# Patient Record
Sex: FEMALE | Race: WHITE | NOT HISPANIC OR LATINO | Employment: FULL TIME | URBAN - METROPOLITAN AREA
[De-identification: names, ages, dates, MRNs, and addresses within clinical notes are randomized per-mention and may not be internally consistent; named-entity substitution may affect disease eponyms.]

---

## 2023-02-24 PROBLEM — G47.00 INSOMNIA: Status: ACTIVE | Noted: 2023-02-24

## 2023-02-24 PROBLEM — E11.22 CKD STAGE 3 SECONDARY TO DIABETES (MULTI): Status: ACTIVE | Noted: 2023-02-24

## 2023-02-24 PROBLEM — F41.9 ANXIETY: Status: ACTIVE | Noted: 2023-02-24

## 2023-02-24 PROBLEM — J45.998 SEASONAL ASTHMA (HHS-HCC): Status: ACTIVE | Noted: 2023-02-24

## 2023-02-24 PROBLEM — F32.A DEPRESSION: Status: ACTIVE | Noted: 2023-02-24

## 2023-02-24 PROBLEM — E78.5 HYPERLIPIDEMIA: Status: ACTIVE | Noted: 2023-02-24

## 2023-02-24 PROBLEM — J30.89 ENVIRONMENTAL AND SEASONAL ALLERGIES: Status: ACTIVE | Noted: 2023-02-24

## 2023-02-24 PROBLEM — E55.9 VITAMIN D DEFICIENCY: Status: ACTIVE | Noted: 2023-02-24

## 2023-02-24 PROBLEM — E03.9 HYPOTHYROIDISM: Status: ACTIVE | Noted: 2023-02-24

## 2023-02-24 PROBLEM — U07.1 COVID-19 VIRUS INFECTION: Status: ACTIVE | Noted: 2023-02-24

## 2023-02-24 PROBLEM — N18.30 CKD STAGE 3 SECONDARY TO DIABETES (MULTI): Status: ACTIVE | Noted: 2023-02-24

## 2023-02-24 PROBLEM — M19.90 ARTHRITIS: Status: ACTIVE | Noted: 2023-02-24

## 2023-02-24 PROBLEM — L30.9 ECZEMA: Status: ACTIVE | Noted: 2023-02-24

## 2023-02-24 PROBLEM — M17.9 OSTEOARTHRITIS OF KNEE: Status: ACTIVE | Noted: 2023-02-24

## 2023-02-24 PROBLEM — E66.01 MORBID OBESITY WITH BMI OF 60.0-69.9, ADULT (MULTI): Status: ACTIVE | Noted: 2023-02-24

## 2023-02-24 PROBLEM — E11.649: Status: ACTIVE | Noted: 2023-02-24

## 2023-02-24 PROBLEM — R53.83 FATIGUE: Status: ACTIVE | Noted: 2023-02-24

## 2023-02-24 PROBLEM — I10 HYPERTENSION: Status: ACTIVE | Noted: 2023-02-24

## 2023-02-24 RX ORDER — LEVOTHYROXINE SODIUM 112 UG/1
1 TABLET ORAL DAILY
COMMUNITY
Start: 2021-01-04 | End: 2023-03-29 | Stop reason: SDUPTHER

## 2023-02-24 RX ORDER — ERGOCALCIFEROL 1.25 MG/1
1 CAPSULE ORAL
COMMUNITY
Start: 2021-01-08 | End: 2023-03-29 | Stop reason: SDUPTHER

## 2023-02-24 RX ORDER — CYCLOBENZAPRINE HCL 10 MG
1 TABLET ORAL DAILY PRN
COMMUNITY
End: 2023-03-29 | Stop reason: SDUPTHER

## 2023-02-24 RX ORDER — SIMVASTATIN 40 MG/1
1 TABLET, FILM COATED ORAL EVERY EVENING
COMMUNITY
End: 2023-03-29 | Stop reason: SDUPTHER

## 2023-02-24 RX ORDER — LISINOPRIL 40 MG/1
1 TABLET ORAL DAILY
COMMUNITY
Start: 2016-02-09 | End: 2023-03-29 | Stop reason: SDUPTHER

## 2023-02-24 RX ORDER — CLOBETASOL PROPIONATE 0.5 MG/G
AEROSOL, FOAM TOPICAL 2 TIMES DAILY
COMMUNITY
Start: 2014-09-06 | End: 2023-03-29 | Stop reason: SDUPTHER

## 2023-02-24 RX ORDER — PIOGLITAZONEHYDROCHLORIDE 30 MG/1
1 TABLET ORAL DAILY
COMMUNITY
End: 2023-03-29 | Stop reason: SDUPTHER

## 2023-02-24 RX ORDER — ALBUTEROL SULFATE 0.83 MG/ML
2.5 SOLUTION RESPIRATORY (INHALATION) EVERY 4 HOURS PRN
COMMUNITY
Start: 2014-09-22 | End: 2024-03-20 | Stop reason: ALTCHOICE

## 2023-02-24 RX ORDER — ALBUTEROL SULFATE 90 UG/1
2 AEROSOL, METERED RESPIRATORY (INHALATION)
COMMUNITY
Start: 2014-09-22 | End: 2024-06-10 | Stop reason: SDUPTHER

## 2023-02-24 RX ORDER — LIFITEGRAST 50 MG/ML
SOLUTION/ DROPS OPHTHALMIC
COMMUNITY
Start: 2021-11-10 | End: 2024-03-20 | Stop reason: ALTCHOICE

## 2023-02-24 RX ORDER — MONTELUKAST SODIUM 10 MG/1
1 TABLET ORAL DAILY
COMMUNITY
Start: 2019-01-02 | End: 2023-03-29 | Stop reason: SDUPTHER

## 2023-02-24 RX ORDER — CELECOXIB 200 MG/1
200 CAPSULE ORAL 2 TIMES DAILY
COMMUNITY
Start: 2019-11-27 | End: 2023-03-29 | Stop reason: SDUPTHER

## 2023-03-08 ENCOUNTER — APPOINTMENT (OUTPATIENT)
Dept: PRIMARY CARE | Facility: CLINIC | Age: 58
End: 2023-03-08

## 2023-03-10 ENCOUNTER — TELEPHONE (OUTPATIENT)
Dept: PRIMARY CARE | Facility: CLINIC | Age: 58
End: 2023-03-10

## 2023-03-10 DIAGNOSIS — E11.9 TYPE 2 DIABETES MELLITUS WITHOUT COMPLICATION, WITHOUT LONG-TERM CURRENT USE OF INSULIN (MULTI): Primary | ICD-10-CM

## 2023-03-10 NOTE — TELEPHONE ENCOUNTER
Refill(s) requested for: Jardiance (25 mg)    Pharmacy:   Pharmacy Number: [facility]    LR: 02/22/2023  LV: 08/24/2022  NV: None

## 2023-03-18 DIAGNOSIS — E03.9 HYPOTHYROIDISM, UNSPECIFIED TYPE: ICD-10-CM

## 2023-03-18 DIAGNOSIS — I10 HYPERTENSION, UNSPECIFIED TYPE: ICD-10-CM

## 2023-03-18 DIAGNOSIS — E78.5 HYPERLIPIDEMIA, UNSPECIFIED HYPERLIPIDEMIA TYPE: ICD-10-CM

## 2023-03-18 DIAGNOSIS — R53.83 OTHER FATIGUE: ICD-10-CM

## 2023-03-23 ENCOUNTER — OFFICE VISIT (OUTPATIENT)
Dept: PRIMARY CARE | Facility: CLINIC | Age: 58
End: 2023-03-23

## 2023-03-23 DIAGNOSIS — Z12.31 VISIT FOR SCREENING MAMMOGRAM: ICD-10-CM

## 2023-03-23 DIAGNOSIS — Z12.31 ENCOUNTER FOR SCREENING MAMMOGRAM FOR MALIGNANT NEOPLASM OF BREAST: ICD-10-CM

## 2023-03-29 ENCOUNTER — OFFICE VISIT (OUTPATIENT)
Dept: PRIMARY CARE | Facility: CLINIC | Age: 58
End: 2023-03-29
Payer: COMMERCIAL

## 2023-03-29 VITALS
DIASTOLIC BLOOD PRESSURE: 79 MMHG | SYSTOLIC BLOOD PRESSURE: 130 MMHG | HEIGHT: 66 IN | BODY MASS INDEX: 47.09 KG/M2 | WEIGHT: 293 LBS | OXYGEN SATURATION: 97 % | HEART RATE: 74 BPM

## 2023-03-29 DIAGNOSIS — E66.01 MORBID OBESITY WITH BMI OF 60.0-69.9, ADULT (MULTI): ICD-10-CM

## 2023-03-29 DIAGNOSIS — J45.998 SEASONAL ASTHMA (HHS-HCC): ICD-10-CM

## 2023-03-29 DIAGNOSIS — R19.5 CHANGE IN CONSISTENCY OF STOOL: ICD-10-CM

## 2023-03-29 DIAGNOSIS — I10 PRIMARY HYPERTENSION: ICD-10-CM

## 2023-03-29 DIAGNOSIS — E11.649 CONTROLLED TYPE 2 DIABETES MELLITUS WITH HYPOGLYCEMIA, WITHOUT LONG-TERM CURRENT USE OF INSULIN (MULTI): ICD-10-CM

## 2023-03-29 DIAGNOSIS — E55.9 VITAMIN D DEFICIENCY: ICD-10-CM

## 2023-03-29 DIAGNOSIS — L30.9 ECZEMA, UNSPECIFIED TYPE: ICD-10-CM

## 2023-03-29 DIAGNOSIS — E11.22 CKD STAGE 3 SECONDARY TO DIABETES (MULTI): ICD-10-CM

## 2023-03-29 DIAGNOSIS — F41.9 ANXIETY: ICD-10-CM

## 2023-03-29 DIAGNOSIS — M19.90 ARTHRITIS: ICD-10-CM

## 2023-03-29 DIAGNOSIS — J30.89 ENVIRONMENTAL AND SEASONAL ALLERGIES: ICD-10-CM

## 2023-03-29 DIAGNOSIS — E78.49 OTHER HYPERLIPIDEMIA: ICD-10-CM

## 2023-03-29 DIAGNOSIS — F32.0 CURRENT MILD EPISODE OF MAJOR DEPRESSIVE DISORDER WITHOUT PRIOR EPISODE (CMS-HCC): ICD-10-CM

## 2023-03-29 DIAGNOSIS — N18.30 CKD STAGE 3 SECONDARY TO DIABETES (MULTI): ICD-10-CM

## 2023-03-29 DIAGNOSIS — E11.9 TYPE 2 DIABETES MELLITUS WITHOUT COMPLICATION, WITHOUT LONG-TERM CURRENT USE OF INSULIN (MULTI): Primary | ICD-10-CM

## 2023-03-29 DIAGNOSIS — E03.8 OTHER SPECIFIED HYPOTHYROIDISM: ICD-10-CM

## 2023-03-29 PROBLEM — U07.1 COVID-19 VIRUS INFECTION: Status: RESOLVED | Noted: 2023-02-24 | Resolved: 2023-03-29

## 2023-03-29 PROBLEM — F32.9 MAJOR DEPRESSIVE DISORDER WITH SINGLE EPISODE: Status: ACTIVE | Noted: 2023-02-24

## 2023-03-29 LAB — POC HEMOGLOBIN A1C: 6.6 % (ref 4.2–6.5)

## 2023-03-29 PROCEDURE — 3075F SYST BP GE 130 - 139MM HG: CPT | Performed by: NURSE PRACTITIONER

## 2023-03-29 PROCEDURE — 83036 HEMOGLOBIN GLYCOSYLATED A1C: CPT | Performed by: NURSE PRACTITIONER

## 2023-03-29 PROCEDURE — 3008F BODY MASS INDEX DOCD: CPT | Performed by: NURSE PRACTITIONER

## 2023-03-29 PROCEDURE — 4010F ACE/ARB THERAPY RXD/TAKEN: CPT | Performed by: NURSE PRACTITIONER

## 2023-03-29 PROCEDURE — 1036F TOBACCO NON-USER: CPT | Performed by: NURSE PRACTITIONER

## 2023-03-29 PROCEDURE — 3066F NEPHROPATHY DOC TX: CPT | Performed by: NURSE PRACTITIONER

## 2023-03-29 PROCEDURE — 99214 OFFICE O/P EST MOD 30 MIN: CPT | Performed by: NURSE PRACTITIONER

## 2023-03-29 PROCEDURE — 3078F DIAST BP <80 MM HG: CPT | Performed by: NURSE PRACTITIONER

## 2023-03-29 RX ORDER — LISINOPRIL 40 MG/1
40 TABLET ORAL DAILY
Qty: 90 TABLET | Refills: 1 | Status: SHIPPED | OUTPATIENT
Start: 2023-03-29 | End: 2023-09-27 | Stop reason: SDUPTHER

## 2023-03-29 RX ORDER — ERGOCALCIFEROL 1.25 MG/1
1 CAPSULE ORAL
Qty: 12 CAPSULE | Refills: 3 | Status: SHIPPED | OUTPATIENT
Start: 2023-03-29 | End: 2023-09-27 | Stop reason: SDUPTHER

## 2023-03-29 RX ORDER — CELECOXIB 200 MG/1
200 CAPSULE ORAL 2 TIMES DAILY
Qty: 90 CAPSULE | Refills: 1 | Status: SHIPPED | OUTPATIENT
Start: 2023-03-29 | End: 2023-07-11 | Stop reason: SDUPTHER

## 2023-03-29 RX ORDER — LEVOTHYROXINE SODIUM 112 UG/1
112 TABLET ORAL DAILY
Qty: 90 TABLET | Refills: 1 | Status: SHIPPED | OUTPATIENT
Start: 2023-03-29 | End: 2023-09-27 | Stop reason: SDUPTHER

## 2023-03-29 RX ORDER — PIOGLITAZONEHYDROCHLORIDE 30 MG/1
30 TABLET ORAL DAILY
Qty: 90 TABLET | Refills: 1 | Status: SHIPPED | OUTPATIENT
Start: 2023-03-29 | End: 2023-09-27 | Stop reason: ALTCHOICE

## 2023-03-29 RX ORDER — CYCLOBENZAPRINE HCL 10 MG
10 TABLET ORAL DAILY PRN
Qty: 30 TABLET | Refills: 5 | Status: SHIPPED | OUTPATIENT
Start: 2023-03-29 | End: 2024-03-20 | Stop reason: ALTCHOICE

## 2023-03-29 RX ORDER — MONTELUKAST SODIUM 10 MG/1
10 TABLET ORAL DAILY
Qty: 90 TABLET | Refills: 1 | Status: SHIPPED | OUTPATIENT
Start: 2023-03-29 | End: 2023-09-27 | Stop reason: SDUPTHER

## 2023-03-29 RX ORDER — CLOBETASOL PROPIONATE 0.5 MG/G
AEROSOL, FOAM TOPICAL 2 TIMES DAILY
Qty: 50 G | Refills: 5 | Status: SHIPPED | OUTPATIENT
Start: 2023-03-29

## 2023-03-29 RX ORDER — SIMVASTATIN 40 MG/1
40 TABLET, FILM COATED ORAL EVERY EVENING
Qty: 90 TABLET | Refills: 1 | Status: SHIPPED | OUTPATIENT
Start: 2023-03-29 | End: 2023-09-27 | Stop reason: SDUPTHER

## 2023-03-29 ASSESSMENT — PATIENT HEALTH QUESTIONNAIRE - PHQ9
1. LITTLE INTEREST OR PLEASURE IN DOING THINGS: NOT AT ALL
2. FEELING DOWN, DEPRESSED OR HOPELESS: NOT AT ALL
SUM OF ALL RESPONSES TO PHQ9 QUESTIONS 1 & 2: 0

## 2023-03-29 NOTE — PROGRESS NOTES
Chief complaint: Presents for med management     HPI  Presents for med management    Recent hospitalizations, surgeries or ER visits: denies    Diet: mix of healthy and unhealthy  Caffeine: 1 per day  Water: Lots   Exercise: active   Alcohol: denies  Tobacco: denies  Rec drugs:       denies  Mammogram:   3/18/22  Pelvic exam: Dr Moreno [facility] 2023  Colonoscopy:    Date        8/28/18 Dr Sheriff            F/u      8/2023                 Family history and social history reviewed.     Allowed to report any questions or concerns.    DM2:   Managed with meds:  Jardiance, Actos,   Does not checks blood sugars.   Denies polydipsia, polyuria or polyphagia.     Hypothyroidism:  Managed with medication: Levothyroxine   Denies palpitations, tremors,  unexplained changes in weight or changes in hair.    Hyperlipidemia:   Managed with medication: Simvastatin   Denies myalgias or arthralgias.    Hypertension:   Managed with medication: Lisinopril  Blood pressures at home: denies  Denies chest pain, palpitations, headaches, dizziness.     Vitamin D deficiency:   Taking prescribed Vit D  Taking MVI    CKD stage 3    Arthritis (Lumbar herniated disk and OA knee):   Managed with med: Celebrex  No bleeding noted    Environmental and seasonal allergies: Seasonal Asthma  Managed with med: Montelukast    Morbid obestiy BMI 63.59    Depression and anxiety:   Lots of stress in life   was in [place] car [place] in [place].   Multiple injuries and surgeries. Lots of medical appointments   Trying to balance home and work life.   No longer under the care of counselor    Abd seems a bit more bloated.   Stool has changed. Somedays not as formed and then she will have a day that it is small formed BM. No blood     ROS  Refer to history of present illness.   Patient denies  fever, chest pain, shortness of breath, headache.     Past Medical History:   Diagnosis Date    Acute upper respiratory infection, unspecified 09/29/2015    Viral  upper respiratory tract infection with cough    Contusion of left shoulder, initial encounter 2017    Contusion of left shoulder, initial encounter    Encounter for gynecological examination (general) (routine) without abnormal findings     Periodic health assessment, Pap and pelvic    Pain in left shoulder 2017    Left shoulder pain    Personal history of other diseases of the musculoskeletal system and connective tissue 2017    History of neck pain    Personal history of other diseases of the musculoskeletal system and connective tissue 2017    History of back pain    Personal history of other diseases of the respiratory system     Personal history of asthma    Personal history of other endocrine, nutritional and metabolic disease 2015    History of type 2 diabetes mellitus    Personal history of other endocrine, nutritional and metabolic disease     History of diabetes mellitus    Personal history of other endocrine, nutritional and metabolic disease     History of hyperlipidemia    Personal history of other medical treatment     H/O mammogram    Personal history of other specified conditions 2015    History of painful urination    Unspecified asthma with (acute) exacerbation 2020    Asthma exacerbation    Unspecified fall, initial encounter 2017    Fall, accidental      Past Surgical History:   Procedure Laterality Date    BACK SURGERY  2014    Back Surgery     SECTION, CLASSIC  2014     Section    HYSTERECTOMY  2019    Hysterectomy    OTHER SURGICAL HISTORY  2019    Meniscus repair    OTHER SURGICAL HISTORY  2022    Complete colonoscopy    TONSILLECTOMY  2014    Tonsillectomy      Family History   Problem Relation Name Age of Onset    Arthritis Mother      Diabetes Father      Hypertension Father      Cancer Father      Hearing loss Daughter      Depression Daughter      Diabetes Sibling      Hypertension Sibling  "     Cancer Sibling         reports that she has never smoked. She has never used smokeless tobacco.     Allergies   Allergen Reactions    Nsaids (Non-Steroidal Anti-Inflammatory Drug) Hives and Swelling      Current Outpatient Medications   Medication Instructions    albuterol 90 mcg/actuation inhaler 2 puffs, inhalation, Every 4-6 hours as needed.    albuterol 2.5 mg, inhalation, Every 4 hours PRN    celecoxib (CELEBREX) 200 mg, oral, 2 times daily, With food.    clobetasol (Olux) 0.05 % topical foam Topical, 2 times daily    cyclobenzaprine (Flexeril) 10 mg tablet 1 tablet, oral, Daily PRN    empagliflozin (JARDIANCE) 25 mg, oral, Daily    ergocalciferol (Vitamin D-2) 1.25 MG (59453 UT) capsule 1 capsule, oral, Weekly    levothyroxine (Synthroid, Levoxyl) 112 mcg tablet 1 tablet, oral, Daily    lifitegrast (Xiidra) 5 % dropperette Xiidra 5 % Ophthalmic Solution   Quantity: 60  Refills: 0        Start : 10-Nov-2021   Active    lisinopril 40 mg tablet 1 tablet, oral, Daily    montelukast (Singulair) 10 mg tablet 1 tablet, oral, Daily    pioglitazone (Actos) 30 mg tablet 1 tablet, oral, Daily    simvastatin (Zocor) 40 mg tablet 1 tablet, oral, Every evening      Visit Vitals  /79   Pulse 74   Ht 1.676 m (5' 6\")   Wt (!) 179 kg (394 lb)   SpO2 97%   BMI 63.59 kg/m²   Smoking Status Never   BSA 2.89 m²      Physical exam  Alert and oriented x 3  Eyes: EOM grossly intact  Neck supple without lymph adenopathy or carotid bruit  Heart regular rate and rhythm without murmur.  Lungs clear to auscultation.  Legs without edema.  Gait is non-antalgic  Speech clear.  Hearing adequate.  Psych: Normal affect. Good judgment and insight.      1. Type 2 diabetes mellitus without complication, without long-term current use of insulin (CMS/[facility])  DM2:   Aware of diabetes goals including hemoglobin A1c less than 7%, blood pressure less than 130/80, and LDL cholesterol less than 70.   Discussed importance of daily exercise as well " as following an American Diabetes Association diet for best control.  Also discussed potential complications due to uncontrolled diabetes including heart attack, stroke, kidney failure, peripheral neuropathy, impaired vision and amputations. We recommend daily skin checks to look for new open areas . We recommend regular visits to a dentist,  podiatrist and an optometrist.      Stable. Continue current medication.   - POCT glycosylated hemoglobin (Hb A1C) manually resulted  - empagliflozin (Jardiance) 25 mg; Take 1 tablet (25 mg) by mouth once daily.  Dispense: 90 tablet; Refill: 1  - pioglitazone (Actos) 30 mg tablet; Take 1 tablet (30 mg) by mouth once daily.  Dispense: 90 tablet; Refill: 1    2. Other specified hypothyroidism  Continue current medication.   If thyroid labs ordered, adjustments will be made if appopriate.   - levothyroxine (Synthroid, Levoxyl) 112 mcg tablet; Take 1 tablet (112 mcg) by mouth once daily.  Dispense: 90 tablet; Refill: 1    3. Other hyperlipidemia  A lipid panel may have been ordered for this visit.   If so, continue current medication until the results can be reviewed.   If adjustments are needed in your medication after reviewing your labs, it will be noted on your lab result and the medication will be sent to your pharmacy.   - simvastatin (Zocor) 40 mg tablet; Take 1 tablet (40 mg) by mouth once daily in the evening.  Dispense: 90 tablet; Refill: 1    4. Primary hypertension   Discussed importance of good blood pressure control to avoid long-term complications such as heart attack and stroke.  Patient is aware that blood pressure goal is less than 130/80.   Maintaining a regular exercise program and body mass index (BMI) less than 25 as well as a diet lower in carbohydrates will help reach these goals.   Info regarding the DASH diet:  https://www.nhlbi.nih.gov/health-topics/dash-eating-plan.  If you are monitoring your blood pressure at home, please bring your blood pressure cuff  at least once a year so we can complete comparative readings.  Stable.  Continue current medications.    - lisinopril 40 mg tablet; Take 1 tablet (40 mg) by mouth once daily.  Dispense: 90 tablet; Refill: 1    5. Vitamin D deficiency  - ergocalciferol (Vitamin D-2) 1.25 MG (43717 UT) capsule; Take 1 capsule (1,250 mcg) by mouth 1 (one) time per week.  Dispense: 12 capsule; Refill: 3    6. CKD stage 3 secondary to diabetes (CMS/[facility])  Stable.   CMP ordered.     7. Arthritis  Stable.   - celecoxib (CeleBREX) 200 mg capsule; Take 1 capsule (200 mg) by mouth in the morning and 1 capsule (200 mg) before bedtime. With food..  Dispense: 90 capsule; Refill: 1  - cyclobenzaprine (Flexeril) 10 mg tablet; Take 1 tablet (10 mg) by mouth once daily as needed for muscle spasms.  Dispense: 30 tablet; Refill: 5    8. Environmental and seasonal allergies  Stable  - montelukast (Singulair) 10 mg tablet; Take 1 tablet (10 mg) by mouth once daily.  Dispense: 90 tablet; Refill: 1    9. Morbid obesity with BMI of 60.0-69.9, adult (CMS/[facility])  Patient encouraged to follow diet of lower carbohydrates and increase vegetable and fruit intake.   Patient also encouraged to increase water intake to 80 ounces/day.   Start or continue exercise for at least 30 minutes a day on most days of the week.     offers various ways to learn about healthy eating and healthy weight loss.      10. Current mild episode of major depressive disorder without prior episode (CMS/[facility])  No meds at this time.   Aware at any time if thoughts of suicide or homicide are present contact medical services immediately.     11. Anxiety  No meds at this time.   Aware at any time if thoughts of suicide or homicide are present contact medical services immediately.     12. Change in consistency of stool  Discussed that it seems like she is having bouts of constipation and then loose stools.  Discussed adding fiber to her diet.  She can also begin MiraLAX at night and hold for  loose stools.  Increase water intake.  If changes do not occur she can follow-up with GI.  She is due for a colonoscopy in August 2023    13. Eczema, unspecified type  - clobetasol (Olux) 0.05 % topical foam; Apply topically 2 times a day.  Dispense: 50 g; Refill: 5    15. Seasonal asthma  - montelukast (Singulair) 10 mg tablet; Take 1 tablet (10 mg) by mouth once daily.  Dispense: 90 tablet; Refill: 1   Albuterol inhaler if needed    Medications refills will be completed as discussed.     Any labs or testing that is ordered will be reviewed and the results will be in your chart .   You can review these via  Netflix.     Follow up six months for complete physical exam.    Prescriptions will not be filled unless you are compliant with your follow-up appointments or have a follow-up appointment scheduled as per the instruction of your provider. Refills for medications should be requested at the time of your office visit.     Please allow one week for refill requests to be completed.     Contact office with any questions or concerns.   Preferred communication is via  Netflix  Please contact [email or web address] if having issues with  Anevia    Jenny KIMBROUGH-[facility]  [address], [address]  [place] [id number]  Phone: [phone]    **Charting was completed using voice recognition technology and may include unintended errors**

## 2023-03-30 ENCOUNTER — LAB (OUTPATIENT)
Dept: LAB | Facility: LAB | Age: 58
End: 2023-03-30
Payer: COMMERCIAL

## 2023-03-30 DIAGNOSIS — E03.9 HYPOTHYROIDISM, UNSPECIFIED TYPE: ICD-10-CM

## 2023-03-30 DIAGNOSIS — I10 HYPERTENSION, UNSPECIFIED TYPE: ICD-10-CM

## 2023-03-30 DIAGNOSIS — E78.5 HYPERLIPIDEMIA, UNSPECIFIED HYPERLIPIDEMIA TYPE: ICD-10-CM

## 2023-03-30 LAB
ALANINE AMINOTRANSFERASE (SGPT) (U/L) IN SER/PLAS: 17 U/L (ref 7–45)
ALBUMIN (G/DL) IN SER/PLAS: 4.2 G/DL (ref 3.4–5)
ALKALINE PHOSPHATASE (U/L) IN SER/PLAS: 63 U/L (ref 33–110)
ANION GAP IN SER/PLAS: 13 MMOL/L (ref 10–20)
ASPARTATE AMINOTRANSFERASE (SGOT) (U/L) IN SER/PLAS: 17 U/L (ref 9–39)
BILIRUBIN TOTAL (MG/DL) IN SER/PLAS: 1 MG/DL (ref 0–1.2)
CALCIUM (MG/DL) IN SER/PLAS: 9.7 MG/DL (ref 8.6–10.6)
CARBON DIOXIDE, TOTAL (MMOL/L) IN SER/PLAS: 24 MMOL/L (ref 21–32)
CHLORIDE (MMOL/L) IN SER/PLAS: 107 MMOL/L (ref 98–107)
CHOLESTEROL (MG/DL) IN SER/PLAS: 164 MG/DL (ref 0–199)
CHOLESTEROL IN HDL (MG/DL) IN SER/PLAS: 38.9 MG/DL
CHOLESTEROL/HDL RATIO: 4.2
CREATININE (MG/DL) IN SER/PLAS: 1.48 MG/DL (ref 0.5–1.05)
GFR FEMALE: 41 ML/MIN/1.73M2
GLUCOSE (MG/DL) IN SER/PLAS: 125 MG/DL (ref 74–99)
LDL: 94 MG/DL (ref 0–99)
POTASSIUM (MMOL/L) IN SER/PLAS: 5.3 MMOL/L (ref 3.5–5.3)
PROTEIN TOTAL: 6.8 G/DL (ref 6.4–8.2)
SODIUM (MMOL/L) IN SER/PLAS: 139 MMOL/L (ref 136–145)
THYROTROPIN (MIU/L) IN SER/PLAS BY DETECTION LIMIT <= 0.05 MIU/L: 0.31 MIU/L (ref 0.44–3.98)
THYROXINE (T4) (UG/DL) IN SER/PLAS: 12.7 UG/DL (ref 4.5–11.1)
TRIGLYCERIDE (MG/DL) IN SER/PLAS: 157 MG/DL (ref 0–149)
UREA NITROGEN (MG/DL) IN SER/PLAS: 47 MG/DL (ref 6–23)
VLDL: 31 MG/DL (ref 0–40)

## 2023-03-30 PROCEDURE — 84436 ASSAY OF TOTAL THYROXINE: CPT

## 2023-03-30 PROCEDURE — 80061 LIPID PANEL: CPT

## 2023-03-30 PROCEDURE — 84443 ASSAY THYROID STIM HORMONE: CPT

## 2023-03-30 PROCEDURE — 36415 COLL VENOUS BLD VENIPUNCTURE: CPT

## 2023-03-30 PROCEDURE — 80053 COMPREHEN METABOLIC PANEL: CPT

## 2023-07-10 ENCOUNTER — TELEPHONE (OUTPATIENT)
Dept: PRIMARY CARE | Facility: CLINIC | Age: 58
End: 2023-07-10

## 2023-07-10 DIAGNOSIS — M19.90 ARTHRITIS: ICD-10-CM

## 2023-07-10 NOTE — TELEPHONE ENCOUNTER
REFILL REQUEST    Med: Celebrex   Med Dose: 200 mg  Med Frequency: one tab twice daily    Pharmacy:   Pharmacy Address: [address] [place] [address]    LR: 03/29/2023  LV: 03/29/2023  NV: 10/03/2023

## 2023-07-11 RX ORDER — CELECOXIB 200 MG/1
200 CAPSULE ORAL 2 TIMES DAILY
Qty: 90 CAPSULE | Refills: 0 | Status: SHIPPED | OUTPATIENT
Start: 2023-07-11 | End: 2023-09-27 | Stop reason: SINTOL

## 2023-08-31 ENCOUNTER — TELEPHONE (OUTPATIENT)
Dept: PRIMARY CARE | Facility: CLINIC | Age: 58
End: 2023-08-31

## 2023-08-31 DIAGNOSIS — M17.10 PRIMARY OSTEOARTHRITIS OF KNEE, UNSPECIFIED LATERALITY: Primary | ICD-10-CM

## 2023-08-31 NOTE — TELEPHONE ENCOUNTER
REFILL REQUEST    Med: Celecoxib  Med Dose: 200 mg  Med Frequency: two caps daily    Pharmacy: [facility]  Pharmacy Address: [address] [place] [address] in [place]    LR: 07/11/2023 (PT WAS ONLY GIVEN A 45 DAY SUPPLY)  LV: 03/29/2023  NV: 10/03/2023

## 2023-09-01 NOTE — TELEPHONE ENCOUNTER
Spoke with pt , she will reduce to once daily Celebrex and would like to try the diclofenac gel. Please send to her pharmacy, [facility].

## 2023-09-04 RX ORDER — DICLOFENAC SODIUM 10 MG/G
GEL TOPICAL
Qty: 100 G | Refills: 1 | Status: SHIPPED | OUTPATIENT
Start: 2023-09-04

## 2023-09-25 ENCOUNTER — APPOINTMENT (OUTPATIENT)
Dept: PRIMARY CARE | Facility: CLINIC | Age: 58
End: 2023-09-25

## 2023-09-27 ENCOUNTER — OFFICE VISIT (OUTPATIENT)
Dept: PRIMARY CARE | Facility: CLINIC | Age: 58
End: 2023-09-27
Payer: COMMERCIAL

## 2023-09-27 VITALS
WEIGHT: 293 LBS | DIASTOLIC BLOOD PRESSURE: 72 MMHG | HEIGHT: 66 IN | OXYGEN SATURATION: 94 % | HEART RATE: 90 BPM | BODY MASS INDEX: 47.09 KG/M2 | SYSTOLIC BLOOD PRESSURE: 134 MMHG

## 2023-09-27 DIAGNOSIS — N18.32 DIABETES MELLITUS DUE TO UNDERLYING CONDITION WITH STAGE 3B CHRONIC KIDNEY DISEASE, WITHOUT LONG-TERM CURRENT USE OF INSULIN (MULTI): ICD-10-CM

## 2023-09-27 DIAGNOSIS — E55.9 VITAMIN D DEFICIENCY: Primary | ICD-10-CM

## 2023-09-27 DIAGNOSIS — M19.90 ARTHRITIS: ICD-10-CM

## 2023-09-27 DIAGNOSIS — I10 PRIMARY HYPERTENSION: ICD-10-CM

## 2023-09-27 DIAGNOSIS — J30.89 ENVIRONMENTAL AND SEASONAL ALLERGIES: ICD-10-CM

## 2023-09-27 DIAGNOSIS — R53.83 OTHER FATIGUE: ICD-10-CM

## 2023-09-27 DIAGNOSIS — E11.9 TYPE 2 DIABETES MELLITUS WITHOUT COMPLICATION, UNSPECIFIED WHETHER LONG TERM INSULIN USE (MULTI): ICD-10-CM

## 2023-09-27 DIAGNOSIS — E78.5 HYPERLIPIDEMIA, UNSPECIFIED HYPERLIPIDEMIA TYPE: ICD-10-CM

## 2023-09-27 DIAGNOSIS — N18.32 STAGE 3B CHRONIC KIDNEY DISEASE (CKD) (MULTI): ICD-10-CM

## 2023-09-27 DIAGNOSIS — E11.9 TYPE 2 DIABETES MELLITUS WITHOUT COMPLICATION, WITHOUT LONG-TERM CURRENT USE OF INSULIN (MULTI): ICD-10-CM

## 2023-09-27 DIAGNOSIS — E08.22 DIABETES MELLITUS DUE TO UNDERLYING CONDITION WITH STAGE 3B CHRONIC KIDNEY DISEASE, WITHOUT LONG-TERM CURRENT USE OF INSULIN (MULTI): ICD-10-CM

## 2023-09-27 DIAGNOSIS — Z13.31 NEGATIVE DEPRESSION SCREENING: ICD-10-CM

## 2023-09-27 DIAGNOSIS — Z00.00 ENCOUNTER FOR HEALTH MAINTENANCE EXAMINATION: ICD-10-CM

## 2023-09-27 DIAGNOSIS — E03.8 OTHER SPECIFIED HYPOTHYROIDISM: ICD-10-CM

## 2023-09-27 DIAGNOSIS — J45.998 SEASONAL ASTHMA (HHS-HCC): ICD-10-CM

## 2023-09-27 DIAGNOSIS — E66.01 MORBID OBESITY WITH BMI OF 60.0-69.9, ADULT (MULTI): ICD-10-CM

## 2023-09-27 PROBLEM — F32.9 MAJOR DEPRESSIVE DISORDER WITH SINGLE EPISODE: Status: RESOLVED | Noted: 2023-02-24 | Resolved: 2023-09-27

## 2023-09-27 LAB — POC HEMOGLOBIN A1C: 6.1 % (ref 4.2–6.5)

## 2023-09-27 PROCEDURE — 3078F DIAST BP <80 MM HG: CPT | Performed by: NURSE PRACTITIONER

## 2023-09-27 PROCEDURE — 4010F ACE/ARB THERAPY RXD/TAKEN: CPT | Performed by: NURSE PRACTITIONER

## 2023-09-27 PROCEDURE — G0444 DEPRESSION SCREEN ANNUAL: HCPCS | Performed by: NURSE PRACTITIONER

## 2023-09-27 PROCEDURE — 83036 HEMOGLOBIN GLYCOSYLATED A1C: CPT | Performed by: NURSE PRACTITIONER

## 2023-09-27 PROCEDURE — 3008F BODY MASS INDEX DOCD: CPT | Performed by: NURSE PRACTITIONER

## 2023-09-27 PROCEDURE — G0447 BEHAVIOR COUNSEL OBESITY 15M: HCPCS | Performed by: NURSE PRACTITIONER

## 2023-09-27 PROCEDURE — 93000 ELECTROCARDIOGRAM COMPLETE: CPT | Performed by: NURSE PRACTITIONER

## 2023-09-27 PROCEDURE — 3075F SYST BP GE 130 - 139MM HG: CPT | Performed by: NURSE PRACTITIONER

## 2023-09-27 PROCEDURE — 1036F TOBACCO NON-USER: CPT | Performed by: NURSE PRACTITIONER

## 2023-09-27 PROCEDURE — 3066F NEPHROPATHY DOC TX: CPT | Performed by: NURSE PRACTITIONER

## 2023-09-27 PROCEDURE — 99214 OFFICE O/P EST MOD 30 MIN: CPT | Performed by: NURSE PRACTITIONER

## 2023-09-27 PROCEDURE — 99396 PREV VISIT EST AGE 40-64: CPT | Performed by: NURSE PRACTITIONER

## 2023-09-27 RX ORDER — ERGOCALCIFEROL 1.25 MG/1
1 CAPSULE ORAL
Qty: 12 CAPSULE | Refills: 3 | Status: SHIPPED | OUTPATIENT
Start: 2023-09-27 | End: 2024-03-20 | Stop reason: SDUPTHER

## 2023-09-27 RX ORDER — LISINOPRIL 40 MG/1
40 TABLET ORAL DAILY
Qty: 90 TABLET | Refills: 1 | Status: SHIPPED | OUTPATIENT
Start: 2023-09-27 | End: 2024-03-20 | Stop reason: SDUPTHER

## 2023-09-27 RX ORDER — SIMVASTATIN 40 MG/1
40 TABLET, FILM COATED ORAL EVERY EVENING
Qty: 90 TABLET | Refills: 1 | Status: SHIPPED | OUTPATIENT
Start: 2023-09-27 | End: 2024-03-20 | Stop reason: SDUPTHER

## 2023-09-27 RX ORDER — LEVOTHYROXINE SODIUM 112 UG/1
112 TABLET ORAL DAILY
Qty: 90 TABLET | Refills: 1 | Status: SHIPPED | OUTPATIENT
Start: 2023-09-27 | End: 2024-03-20 | Stop reason: SDUPTHER

## 2023-09-27 RX ORDER — MONTELUKAST SODIUM 10 MG/1
10 TABLET ORAL DAILY
Qty: 90 TABLET | Refills: 1 | Status: SHIPPED | OUTPATIENT
Start: 2023-09-27 | End: 2024-03-20 | Stop reason: SDUPTHER

## 2023-09-27 ASSESSMENT — PROMIS GLOBAL HEALTH SCALE
RATE_AVERAGE_FATIGUE: SEVERE
RATE_GENERAL_HEALTH: FAIR
CARRYOUT_SOCIAL_ACTIVITIES: VERY GOOD
EMOTIONAL_PROBLEMS: OFTEN
RATE_QUALITY_OF_LIFE: FAIR
RATE_AVERAGE_PAIN: 5
CARRYOUT_PHYSICAL_ACTIVITIES: A LITTLE
RATE_PHYSICAL_HEALTH: POOR
RATE_SOCIAL_SATISFACTION: GOOD
RATE_MENTAL_HEALTH: FAIR

## 2023-09-28 ENCOUNTER — TELEPHONE (OUTPATIENT)
Dept: PRIMARY CARE | Facility: CLINIC | Age: 58
End: 2023-09-28

## 2023-09-28 NOTE — TELEPHONE ENCOUNTER
Pt said her pharm told her that the Rybelsus you prescribed yesterday needs a prior auth. The pharm said they have reached out about this. Can you look into this please?

## 2023-10-03 ENCOUNTER — APPOINTMENT (OUTPATIENT)
Dept: PRIMARY CARE | Facility: CLINIC | Age: 58
End: 2023-10-03

## 2023-10-11 ENCOUNTER — APPOINTMENT (OUTPATIENT)
Dept: RADIOLOGY | Facility: CLINIC | Age: 58
End: 2023-10-11
Payer: COMMERCIAL

## 2023-11-09 ENCOUNTER — ANCILLARY PROCEDURE (OUTPATIENT)
Dept: RADIOLOGY | Facility: CLINIC | Age: 58
End: 2023-11-09
Payer: COMMERCIAL

## 2023-11-09 VITALS — BODY MASS INDEX: 47.09 KG/M2 | HEIGHT: 66 IN | WEIGHT: 293 LBS

## 2023-11-09 DIAGNOSIS — Z12.31 ENCOUNTER FOR SCREENING MAMMOGRAM FOR MALIGNANT NEOPLASM OF BREAST: ICD-10-CM

## 2023-11-09 PROCEDURE — 77067 SCR MAMMO BI INCL CAD: CPT

## 2023-11-09 PROCEDURE — 77067 SCR MAMMO BI INCL CAD: CPT | Mod: BILATERAL PROCEDURE | Performed by: RADIOLOGY

## 2023-11-09 PROCEDURE — 77063 BREAST TOMOSYNTHESIS BI: CPT | Mod: BILATERAL PROCEDURE | Performed by: RADIOLOGY

## 2023-11-13 ENCOUNTER — TELEPHONE (OUTPATIENT)
Dept: PRIMARY CARE | Facility: CLINIC | Age: 58
End: 2023-11-13
Payer: COMMERCIAL

## 2023-11-13 NOTE — TELEPHONE ENCOUNTER
Pt called and left vm stating that she tried to  her Rybelsus from the pharmacy and it was still going to cost over $ 500 per month. I called her back and advised to contact her insurance to see what is covered and we'll try that.

## 2023-11-21 ENCOUNTER — LAB (OUTPATIENT)
Dept: LAB | Facility: LAB | Age: 58
End: 2023-11-21
Payer: COMMERCIAL

## 2023-11-21 DIAGNOSIS — E66.01 MORBID OBESITY WITH BMI OF 60.0-69.9, ADULT (MULTI): ICD-10-CM

## 2023-11-21 DIAGNOSIS — E11.649 CONTROLLED TYPE 2 DIABETES MELLITUS WITH HYPOGLYCEMIA, WITHOUT LONG-TERM CURRENT USE OF INSULIN (MULTI): ICD-10-CM

## 2023-11-21 DIAGNOSIS — N18.30 CKD STAGE 3 SECONDARY TO DIABETES (MULTI): ICD-10-CM

## 2023-11-21 DIAGNOSIS — I10 PRIMARY HYPERTENSION: ICD-10-CM

## 2023-11-21 DIAGNOSIS — E78.49 OTHER HYPERLIPIDEMIA: ICD-10-CM

## 2023-11-21 DIAGNOSIS — E11.22 CKD STAGE 3 SECONDARY TO DIABETES (MULTI): ICD-10-CM

## 2023-11-21 DIAGNOSIS — E55.9 VITAMIN D DEFICIENCY: ICD-10-CM

## 2023-11-21 DIAGNOSIS — E11.9 TYPE 2 DIABETES MELLITUS WITHOUT COMPLICATION, WITHOUT LONG-TERM CURRENT USE OF INSULIN (MULTI): Primary | ICD-10-CM

## 2023-11-21 DIAGNOSIS — E03.9 HYPOTHYROIDISM, UNSPECIFIED TYPE: ICD-10-CM

## 2023-11-21 LAB
25(OH)D3 SERPL-MCNC: 30 NG/ML (ref 30–100)
ALBUMIN SERPL BCP-MCNC: 4.1 G/DL (ref 3.4–5)
ALP SERPL-CCNC: 62 U/L (ref 33–110)
ALT SERPL W P-5'-P-CCNC: 37 U/L (ref 7–45)
ANION GAP SERPL CALC-SCNC: 16 MMOL/L (ref 10–20)
APPEARANCE UR: CLEAR
AST SERPL W P-5'-P-CCNC: 25 U/L (ref 9–39)
BILIRUB SERPL-MCNC: 0.9 MG/DL (ref 0–1.2)
BILIRUB UR STRIP.AUTO-MCNC: NEGATIVE MG/DL
BUN SERPL-MCNC: 21 MG/DL (ref 6–23)
CALCIUM SERPL-MCNC: 9.3 MG/DL (ref 8.6–10.3)
CHLORIDE SERPL-SCNC: 102 MMOL/L (ref 98–107)
CHOLEST SERPL-MCNC: 158 MG/DL (ref 0–199)
CHOLESTEROL/HDL RATIO: 4
CO2 SERPL-SCNC: 24 MMOL/L (ref 21–32)
COLOR UR: YELLOW
CREAT SERPL-MCNC: 1.08 MG/DL (ref 0.5–1.05)
ERYTHROCYTE [DISTWIDTH] IN BLOOD BY AUTOMATED COUNT: 14 % (ref 11.5–14.5)
EST. AVERAGE GLUCOSE BLD GHB EST-MCNC: 151 MG/DL
GFR SERPL CREATININE-BSD FRML MDRD: 60 ML/MIN/1.73M*2
GLUCOSE SERPL-MCNC: 213 MG/DL (ref 74–99)
GLUCOSE UR STRIP.AUTO-MCNC: ABNORMAL MG/DL
HBA1C MFR BLD: 6.9 %
HCT VFR BLD AUTO: 46.5 % (ref 36–46)
HDLC SERPL-MCNC: 39.9 MG/DL
HGB BLD-MCNC: 14.6 G/DL (ref 12–16)
KETONES UR STRIP.AUTO-MCNC: NEGATIVE MG/DL
LDLC SERPL CALC-MCNC: 79 MG/DL
LEUKOCYTE ESTERASE UR QL STRIP.AUTO: ABNORMAL
MCH RBC QN AUTO: 29.7 PG (ref 26–34)
MCHC RBC AUTO-ENTMCNC: 31.4 G/DL (ref 32–36)
MCV RBC AUTO: 95 FL (ref 80–100)
MUCOUS THREADS #/AREA URNS AUTO: ABNORMAL /LPF
NITRITE UR QL STRIP.AUTO: NEGATIVE
NON HDL CHOLESTEROL: 118 MG/DL (ref 0–149)
NRBC BLD-RTO: 0 /100 WBCS (ref 0–0)
PH UR STRIP.AUTO: 5 [PH]
PLATELET # BLD AUTO: 228 X10*3/UL (ref 150–450)
POTASSIUM SERPL-SCNC: 4.1 MMOL/L (ref 3.5–5.3)
PROT SERPL-MCNC: 6.9 G/DL (ref 6.4–8.2)
PROT UR STRIP.AUTO-MCNC: NEGATIVE MG/DL
RBC # BLD AUTO: 4.91 X10*6/UL (ref 4–5.2)
RBC # UR STRIP.AUTO: NEGATIVE /UL
RBC #/AREA URNS AUTO: ABNORMAL /HPF
SODIUM SERPL-SCNC: 138 MMOL/L (ref 136–145)
SP GR UR STRIP.AUTO: 1.03
SQUAMOUS #/AREA URNS AUTO: ABNORMAL /HPF
T4 FREE SERPL-MCNC: 1.08 NG/DL (ref 0.61–1.12)
TRIGL SERPL-MCNC: 195 MG/DL (ref 0–149)
TSH SERPL-ACNC: 1.19 MIU/L (ref 0.44–3.98)
UROBILINOGEN UR STRIP.AUTO-MCNC: <2 MG/DL
VLDL: 39 MG/DL (ref 0–40)
WBC # BLD AUTO: 8.2 X10*3/UL (ref 4.4–11.3)
WBC #/AREA URNS AUTO: ABNORMAL /HPF
YEAST BUDDING #/AREA UR COMP ASSIST: PRESENT /HPF

## 2023-11-21 PROCEDURE — 82306 VITAMIN D 25 HYDROXY: CPT

## 2023-11-21 PROCEDURE — 80053 COMPREHEN METABOLIC PANEL: CPT

## 2023-11-21 PROCEDURE — 84443 ASSAY THYROID STIM HORMONE: CPT

## 2023-11-21 PROCEDURE — 84439 ASSAY OF FREE THYROXINE: CPT

## 2023-11-21 PROCEDURE — 80061 LIPID PANEL: CPT

## 2023-11-21 PROCEDURE — 36415 COLL VENOUS BLD VENIPUNCTURE: CPT

## 2023-11-21 PROCEDURE — 81001 URINALYSIS AUTO W/SCOPE: CPT

## 2023-11-21 PROCEDURE — 85027 COMPLETE CBC AUTOMATED: CPT

## 2023-11-21 PROCEDURE — 83036 HEMOGLOBIN GLYCOSYLATED A1C: CPT

## 2023-11-21 RX ORDER — PIOGLITAZONEHYDROCHLORIDE 30 MG/1
30 TABLET ORAL DAILY
Qty: 30 TABLET | Refills: 0 | Status: SHIPPED | OUTPATIENT
Start: 2023-11-21 | End: 2023-12-26 | Stop reason: SDUPTHER

## 2023-11-21 NOTE — TELEPHONE ENCOUNTER
Patient is here for a visit with the clinical ambulatory pharmacy team in regards to her diabetes and medication cost. Pt reported recently switched from Actos to Rybelsus but Rybelsus is not covered by the insurance. Pt referred to Clinical Pharmacy by ANTHONY Reyez. Pt scheduled for an initial appointment on 11/27/2023 @ 10 AM.    She reported she needs a refill on Actos until next appointment to help with her blood sugar.  Refills sent to [facility] on [address] for Actos 30mg tablet, 1 po daily x 30 days until next appointment with pharmacy.    Will discuss at next visit:  Cost of medications  Ways to reduce cost  Best affordable treatment option for the patient    Continue all other medications as prescribed.    Ruddy Russell, PharmD

## 2023-11-27 ENCOUNTER — TELEMEDICINE (OUTPATIENT)
Dept: PHARMACY | Facility: HOSPITAL | Age: 58
End: 2023-11-27
Payer: COMMERCIAL

## 2023-11-27 DIAGNOSIS — E11.9 TYPE 2 DIABETES MELLITUS WITHOUT COMPLICATION, WITHOUT LONG-TERM CURRENT USE OF INSULIN (MULTI): Primary | ICD-10-CM

## 2023-11-27 ASSESSMENT — ENCOUNTER SYMPTOMS
VISUAL CHANGE: 0
WEIGHT LOSS: 0
POLYPHAGIA: 0
BLURRED VISION: 0
FATIGUE: 1
WEAKNESS: 1
POLYDIPSIA: 0

## 2023-11-27 NOTE — ASSESSMENT & PLAN NOTE
CONTINUE the current medication  Actos 30mg po one daily  Jardiance 25mg po daily  Pt unable to afford Rybelsus due to cost. Insurance won't cover Rybelsus at an affordable price. Recommend staying on Actos until pt's insurance changes as she finds a different job. A1c is controlled at 6.9%  Educated pt on Rybelsus and GLP-1. Review risk and benefits with the patient. Should she get better insurance coverage, she should try GLP-1. Pt does not qualify for [facility] or [employer] patient assistance program. COPAY card is $500 for 1 month supply, still not affordable. Pt denies hx of alcohol use or cirrhosis. Actos does not require renal dose adjustment. AST/ALT WNL. Trigs elevated.  FUV in 3 months to assess symptoms, insurance, and glucose levels (A1c)

## 2023-11-27 NOTE — PROGRESS NOTES
[facility] Pharmacy Consult  Alicia Cotto is a 58 y.o. female was referred to Clinical Pharmacy Team for a Pharmacy consult.  The patient was referred for their Diabetes.    Referring Provider: LUIS E Nelson-CNP    Subjective   Allergies   Allergen Reactions    Ibuprofen Swelling    Naproxen Swelling    Nsaids (Non-Steroidal Anti-Inflammatory Drug) Hives and Swelling       [id number] - [place], [place] - [address]  [address] [address] [address]  Phone: [phone] Fax: [phone]      Diabetes  She presents for her initial diabetic visit. She has type 2 diabetes mellitus. The initial diagnosis of diabetes was made 10 years ago. Her disease course has been stable. There are no hypoglycemic associated symptoms. Associated symptoms include fatigue and weakness. Pertinent negatives for diabetes include no blurred vision, no chest pain, no foot paresthesias, no foot ulcerations, no polydipsia, no polyphagia, no polyuria, no visual change and no weight loss. There are no hypoglycemic complications. Symptoms are stable. There are no diabetic complications. Risk factors for coronary artery disease include diabetes mellitus, dyslipidemia and hypertension. She is following a generally healthy diet. She rarely participates in exercise.     Pt does not drink alcohol, denies heart attack or stroke.       Review of Systems   Constitutional:  Positive for fatigue. Negative for weight loss.   Eyes:  Negative for blurred vision.   Cardiovascular:  Negative for chest pain.   Endocrine: Negative for polydipsia, polyphagia and polyuria.   Neurological:  Positive for weakness.       Objective     There were no vitals taken for this visit.     LAB  Lab Results   Component Value Date    BILITOT 0.9 11/21/2023    CALCIUM 9.3 11/21/2023    CO2 24 11/21/2023     11/21/2023    CREATININE 1.08 (H) 11/21/2023    GLUCOSE 213 (H) 11/21/2023    ALKPHOS 62 11/21/2023    K 4.1 11/21/2023    PROT 6.9 11/21/2023      11/21/2023    AST 25 11/21/2023    ALT 37 11/21/2023    BUN 21 11/21/2023    ANIONGAP 16 11/21/2023    ALBUMIN 4.1 11/21/2023    AMYLASE 21 (L) 01/15/2018    LIPASE 15 01/15/2018    GFRF 41 (A) 03/30/2023     Lab Results   Component Value Date    TRIG 195 (H) 11/21/2023    CHOL 158 11/21/2023    LDLCALC 79 11/21/2023    HDL 39.9 11/21/2023     Lab Results   Component Value Date    HGBA1C 6.9 (H) 11/21/2023       Current Outpatient Medications on File Prior to Visit   Medication Sig Dispense Refill    albuterol 2.5 mg /3 mL (0.083 %) nebulizer solution Inhale 3 mL (2.5 mg) every 4 hours if needed.      albuterol 90 mcg/actuation inhaler Inhale 2 puffs. Every 4-6 hours as needed.      clobetasol (Olux) 0.05 % topical foam Apply topically 2 times a day. 50 g 5    cyclobenzaprine (Flexeril) 10 mg tablet Take 1 tablet (10 mg) by mouth once daily as needed for muscle spasms. 30 tablet 5    diclofenac sodium (Voltaren) 1 % gel gel Apply 4 grams to affected knee 3-4 x daily 100 g 1    empagliflozin (Jardiance) 25 mg Take 1 tablet (25 mg) by mouth once daily. 90 tablet 1    ergocalciferol (Vitamin D-2) 1.25 MG (11537 UT) capsule Take 1 capsule (1,250 mcg) by mouth 1 (one) time per week. 12 capsule 3    levothyroxine (Synthroid, Levoxyl) 112 mcg tablet Take 1 tablet (112 mcg) by mouth once daily. 90 tablet 1    lifitegrast (Xiidra) 5 % dropperette Xiidra 5 % Ophthalmic Solution   Quantity: 60  Refills: 0        Start : 10-Nov-2021   Active      lisinopril 40 mg tablet Take 1 tablet (40 mg) by mouth once daily. 90 tablet 1    montelukast (Singulair) 10 mg tablet Take 1 tablet (10 mg) by mouth once daily. 90 tablet 1    pioglitazone (Actos) 30 mg tablet Take 1 tablet (30 mg) by mouth once daily. 30 tablet 0    semaglutide (Rybelsus) 3 mg tablet Take 1 tablet (3 mg) by mouth once daily. 30 tablet 1    simvastatin (Zocor) 40 mg tablet Take 1 tablet (40 mg) by mouth once daily in the evening. 90 tablet 1     No current  facility-administered medications on file prior to visit.        HISTORICAL PHARMACOTHERAPY  -Rybelsus too costly    DRUG INTERACTIONS  - No drug interactions were found at this visit.    SECONDARY PREVENTION  - Statin? yes  - ACE-I/ARB? yes    Assessment/Plan   Problem List Items Addressed This Visit       Type 2 diabetes mellitus without complication, without long-term current use of insulin (CMS/[facility]) - Primary     CONTINUE the current medication  Actos 30mg po one daily  Jardiance 25mg po daily  Pt unable to afford Rybelsus due to cost. Insurance won't cover Rybelsus at an affordable price. Recommend staying on Actos until pt's insurance changes as she finds a different job. A1c is controlled at 6.9%  Educated pt on Rybelsus and GLP-1. Review risk and benefits with the patient. Should she get better insurance coverage, she should try GLP-1. Pt does not qualify for [facility] or  patient assistance program. COPAY card is $500 for 1 month supply, still not affordable. Pt denies hx of alcohol use or cirrhosis. Actos does not require renal dose adjustment. AST/ALT WNL. Trigs elevated.  FUV in 3 months to assess symptoms, insurance, and glucose levels (A1c)             Continue all meds under the continuation of care with the referring provider and clinical pharmacy team.    Ruddy Russell PharmD     Verbal consent to manage patient's drug therapy was obtained from [the patient and/or an individual authorized to act on behalf of a patient]. They were informed they may decline to participate or withdraw from participation in pharmacy services at any time.

## 2023-12-26 ENCOUNTER — TELEPHONE (OUTPATIENT)
Dept: PRIMARY CARE | Facility: CLINIC | Age: 58
End: 2023-12-26
Payer: COMMERCIAL

## 2023-12-26 DIAGNOSIS — E11.9 TYPE 2 DIABETES MELLITUS WITHOUT COMPLICATION, WITHOUT LONG-TERM CURRENT USE OF INSULIN (MULTI): ICD-10-CM

## 2023-12-26 RX ORDER — PIOGLITAZONEHYDROCHLORIDE 30 MG/1
30 TABLET ORAL DAILY
Qty: 30 TABLET | Refills: 3 | Status: SHIPPED | OUTPATIENT
Start: 2023-12-26 | End: 2024-04-27 | Stop reason: SDUPTHER

## 2024-01-03 DIAGNOSIS — E08.22 DIABETES MELLITUS DUE TO UNDERLYING CONDITION WITH STAGE 3B CHRONIC KIDNEY DISEASE, WITHOUT LONG-TERM CURRENT USE OF INSULIN (MULTI): Primary | ICD-10-CM

## 2024-01-03 DIAGNOSIS — N18.32 DIABETES MELLITUS DUE TO UNDERLYING CONDITION WITH STAGE 3B CHRONIC KIDNEY DISEASE, WITHOUT LONG-TERM CURRENT USE OF INSULIN (MULTI): Primary | ICD-10-CM

## 2024-01-16 ENCOUNTER — APPOINTMENT (OUTPATIENT)
Dept: CARDIOLOGY | Facility: CLINIC | Age: 59
End: 2024-01-16
Payer: COMMERCIAL

## 2024-02-12 ENCOUNTER — APPOINTMENT (OUTPATIENT)
Dept: PHARMACY | Facility: HOSPITAL | Age: 59
End: 2024-02-12
Payer: COMMERCIAL

## 2024-03-09 ENCOUNTER — LAB REQUISITION (OUTPATIENT)
Dept: LAB | Facility: HOSPITAL | Age: 59
End: 2024-03-09
Payer: COMMERCIAL

## 2024-03-09 DIAGNOSIS — M54.50 LOW BACK PAIN, UNSPECIFIED: ICD-10-CM

## 2024-03-09 PROCEDURE — 87086 URINE CULTURE/COLONY COUNT: CPT

## 2024-03-11 LAB — BACTERIA UR CULT: NORMAL

## 2024-03-12 ENCOUNTER — APPOINTMENT (OUTPATIENT)
Dept: PRIMARY CARE | Facility: CLINIC | Age: 59
End: 2024-03-12
Payer: COMMERCIAL

## 2024-03-20 ENCOUNTER — OFFICE VISIT (OUTPATIENT)
Dept: PRIMARY CARE | Facility: CLINIC | Age: 59
End: 2024-03-20
Payer: COMMERCIAL

## 2024-03-20 VITALS
HEART RATE: 88 BPM | OXYGEN SATURATION: 97 % | SYSTOLIC BLOOD PRESSURE: 128 MMHG | WEIGHT: 293 LBS | HEIGHT: 66 IN | BODY MASS INDEX: 47.09 KG/M2 | DIASTOLIC BLOOD PRESSURE: 70 MMHG

## 2024-03-20 DIAGNOSIS — E08.22 DIABETES MELLITUS DUE TO UNDERLYING CONDITION WITH STAGE 3B CHRONIC KIDNEY DISEASE, WITHOUT LONG-TERM CURRENT USE OF INSULIN (MULTI): ICD-10-CM

## 2024-03-20 DIAGNOSIS — E78.5 HYPERLIPIDEMIA, UNSPECIFIED HYPERLIPIDEMIA TYPE: ICD-10-CM

## 2024-03-20 DIAGNOSIS — E03.9 HYPOTHYROIDISM, UNSPECIFIED TYPE: ICD-10-CM

## 2024-03-20 DIAGNOSIS — E55.9 VITAMIN D DEFICIENCY: ICD-10-CM

## 2024-03-20 DIAGNOSIS — E03.8 OTHER SPECIFIED HYPOTHYROIDISM: ICD-10-CM

## 2024-03-20 DIAGNOSIS — E11.9 TYPE 2 DIABETES MELLITUS WITHOUT COMPLICATION, WITHOUT LONG-TERM CURRENT USE OF INSULIN (MULTI): ICD-10-CM

## 2024-03-20 DIAGNOSIS — R60.1 GENERALIZED EDEMA: Primary | ICD-10-CM

## 2024-03-20 DIAGNOSIS — N18.32 DIABETES MELLITUS DUE TO UNDERLYING CONDITION WITH STAGE 3B CHRONIC KIDNEY DISEASE, WITHOUT LONG-TERM CURRENT USE OF INSULIN (MULTI): ICD-10-CM

## 2024-03-20 DIAGNOSIS — M19.90 ARTHRITIS: ICD-10-CM

## 2024-03-20 DIAGNOSIS — I10 PRIMARY HYPERTENSION: ICD-10-CM

## 2024-03-20 DIAGNOSIS — N18.32 STAGE 3B CHRONIC KIDNEY DISEASE (CKD) (MULTI): ICD-10-CM

## 2024-03-20 DIAGNOSIS — E66.01 MORBID OBESITY WITH BMI OF 60.0-69.9, ADULT (MULTI): ICD-10-CM

## 2024-03-20 DIAGNOSIS — J30.89 ENVIRONMENTAL AND SEASONAL ALLERGIES: ICD-10-CM

## 2024-03-20 DIAGNOSIS — J45.998 SEASONAL ASTHMA (HHS-HCC): ICD-10-CM

## 2024-03-20 LAB — POC HEMOGLOBIN A1C: 6.7 % (ref 4.2–6.5)

## 2024-03-20 PROCEDURE — 83036 HEMOGLOBIN GLYCOSYLATED A1C: CPT | Performed by: NURSE PRACTITIONER

## 2024-03-20 PROCEDURE — 3074F SYST BP LT 130 MM HG: CPT | Performed by: NURSE PRACTITIONER

## 2024-03-20 PROCEDURE — 3008F BODY MASS INDEX DOCD: CPT | Performed by: NURSE PRACTITIONER

## 2024-03-20 PROCEDURE — 99214 OFFICE O/P EST MOD 30 MIN: CPT | Performed by: NURSE PRACTITIONER

## 2024-03-20 PROCEDURE — 4010F ACE/ARB THERAPY RXD/TAKEN: CPT | Performed by: NURSE PRACTITIONER

## 2024-03-20 PROCEDURE — 1036F TOBACCO NON-USER: CPT | Performed by: NURSE PRACTITIONER

## 2024-03-20 PROCEDURE — 3078F DIAST BP <80 MM HG: CPT | Performed by: NURSE PRACTITIONER

## 2024-03-20 RX ORDER — SIMVASTATIN 40 MG/1
40 TABLET, FILM COATED ORAL EVERY EVENING
Qty: 90 TABLET | Refills: 1 | Status: SHIPPED | OUTPATIENT
Start: 2024-03-20

## 2024-03-20 RX ORDER — LEVOTHYROXINE SODIUM 112 UG/1
112 TABLET ORAL DAILY
Qty: 90 TABLET | Refills: 1 | Status: SHIPPED | OUTPATIENT
Start: 2024-03-20

## 2024-03-20 RX ORDER — ERGOCALCIFEROL 1.25 MG/1
50000 CAPSULE ORAL
Qty: 13 CAPSULE | Refills: 3 | Status: SHIPPED | OUTPATIENT
Start: 2024-03-20 | End: 2025-03-20

## 2024-03-20 RX ORDER — LISINOPRIL 40 MG/1
40 TABLET ORAL DAILY
Qty: 90 TABLET | Refills: 1 | Status: SHIPPED | OUTPATIENT
Start: 2024-03-20

## 2024-03-20 RX ORDER — CYCLOBENZAPRINE HCL 5 MG
5 TABLET ORAL 3 TIMES DAILY PRN
Qty: 30 TABLET | Refills: 1 | Status: SHIPPED | OUTPATIENT
Start: 2024-03-20 | End: 2024-05-19

## 2024-03-20 RX ORDER — HYDROCHLOROTHIAZIDE 25 MG/1
25 TABLET ORAL DAILY PRN
Qty: 30 TABLET | Refills: 5 | Status: SHIPPED | OUTPATIENT
Start: 2024-03-20 | End: 2024-09-16

## 2024-03-20 RX ORDER — MONTELUKAST SODIUM 10 MG/1
10 TABLET ORAL DAILY
Qty: 90 TABLET | Refills: 1 | Status: SHIPPED | OUTPATIENT
Start: 2024-03-20

## 2024-03-20 NOTE — PROGRESS NOTES
General Medical Management Note    58 y.o. female presents for Medical Management  HPI    Denies recent hospitalizations, surgeries or ER visits    Diet: trying to eat lower carbs  Caffeine: 1 per day  Water:  Needs to increase  Exercise: walking at work  Alcohol: denies  Tobacco:  denies  Mammogram: 11/9/23  Pelvic and Pap: [facility] Dr Lorena Garibay-2022  Colonoscopy: Date 8/18 Dr Austin Sheriff F/u 2023  Stress test:      Family history and social history reviewed.   Allowed to report any questions or concerns.     Hyperlipidemia:  Med:  Simvastatin.   Denies myalgias or arthralgias.     Hypertension:   Med: Lisinopril  Denies chest pain, palpitations, headaches, dizziness.     Environmental and seasonal allergies/seasonal asthma:  Med: Singular daily   She will use ProAir if needed      DM2:   Med: Actos ---could not take Jardiance r/t price   Checks blood sugars at times 100-120s.   Rybelsus was too expensive  She has worked with our in office Pharm D      Hypothyroidism:  Med: Synthroid   Denies palpitations, tremors, unexplained changes in weight.      Vitamin D deficiency:   Taking prescribed Vit D  Taking MV    Lower back pain:   States at times she has occasional numbness down leg  Taking Tylenol    Arthritis:      She has taken a few Celebrex for back pain    Feels as if she has generalized swelling    Past Medical History:   Diagnosis Date    Acute upper respiratory infection, unspecified 09/29/2015    Viral upper respiratory tract infection with cough    Allergic     Anxiety     Arthritis     Contusion of left shoulder, initial encounter 06/23/2017    Contusion of left shoulder, initial encounter    COVID-19 virus infection 02/24/2023    We discussed her overall lingering symptoms and the other possible causes for her symptoms.  Continue to monitor. She can contact the office if she would like to be referred to the long-[facility] COVID clinic.    Depression     Diabetes mellitus (CMS/HCC)     Eczema     Encounter  for gynecological examination (general) (routine) without abnormal findings     Periodic health assessment, Pap and pelvic    Major depressive disorder with single episode 2023    Pain in left shoulder 2017    Left shoulder pain    Personal history of other diseases of the musculoskeletal system and connective tissue 2017    History of neck pain    Personal history of other diseases of the musculoskeletal system and connective tissue 2017    History of back pain    Personal history of other diseases of the respiratory system     Personal history of asthma    Personal history of other endocrine, nutritional and metabolic disease 2015    History of type 2 diabetes mellitus    Personal history of other endocrine, nutritional and metabolic disease     History of diabetes mellitus    Personal history of other endocrine, nutritional and metabolic disease     History of hyperlipidemia    Personal history of other medical treatment     H/O mammogram    Personal history of other specified conditions 2015    History of painful urination    Unspecified asthma with (acute) exacerbation 2020    Asthma exacerbation    Unspecified fall, initial encounter 2017    Fall, accidental      Past Surgical History:   Procedure Laterality Date    BACK SURGERY  2014    Back Surgery     SECTION, CLASSIC  2014     Section     SECTION, LOW TRANSVERSE  1991    EYE SURGERY  1/10/21    HYSTERECTOMY  2019    Hysterectomy    OTHER SURGICAL HISTORY  2019    Meniscus repair    OTHER SURGICAL HISTORY  2022    Complete colonoscopy    TONSILLECTOMY  2014    Tonsillectomy     Family History   Problem Relation Name Age of Onset    Arthritis Mother Tammi     Diabetes Father Bruno     Hypertension Father Bruno     Cancer Father Bruno     Breast cancer Sister  49    Hearing loss Daughter Randee     Depression Daughter Randee     Diabetes Sibling       Hypertension Sibling      Cancer Sibling        Social History     Socioeconomic History    Marital status:      Spouse name: Not on file    Number of children: Not on file    Years of education: Not on file    Highest education level: Not on file   Occupational History    Not on file   Tobacco Use    Smoking status: Never    Smokeless tobacco: Never   Substance and Sexual Activity    Alcohol use: Never    Drug use: Never    Sexual activity: Not Currently     Partners: Male   Other Topics Concern    Not on file   Social History Narrative    Not on file     Social Determinants of Health     Financial Resource Strain: Not on file   Food Insecurity: Not on file   Transportation Needs: Not on file   Physical Activity: Not on file   Stress: Not on file   Social Connections: Not on file   Intimate Partner Violence: Not on file   Housing Stability: Not on file       Current Outpatient Medications on File Prior to Visit   Medication Sig Dispense Refill    albuterol 2.5 mg /3 mL (0.083 %) nebulizer solution Inhale 3 mL (2.5 mg) every 4 hours if needed.      albuterol 90 mcg/actuation inhaler Inhale 2 puffs. Every 4-6 hours as needed.      clobetasol (Olux) 0.05 % topical foam Apply topically 2 times a day. 50 g 5    cyclobenzaprine (Flexeril) 10 mg tablet Take 1 tablet (10 mg) by mouth once daily as needed for muscle spasms. 30 tablet 5    diclofenac sodium (Voltaren) 1 % gel gel Apply 4 grams to affected knee 3-4 x daily 100 g 1    empagliflozin (Jardiance) 25 mg Take 1 tablet (25 mg) by mouth once daily. 90 tablet 1    ergocalciferol (Vitamin D-2) 1.25 MG (94157 UT) capsule Take 1 capsule (1,250 mcg) by mouth 1 (one) time per week. 12 capsule 3    levothyroxine (Synthroid, Levoxyl) 112 mcg tablet Take 1 tablet (112 mcg) by mouth once daily. 90 tablet 1    lifitegrast (Xiidra) 5 % dropperette Xiidra 5 % Ophthalmic Solution   Quantity: 60  Refills: 0        Start : 10-Nov-2021   Active      lisinopril 40 mg tablet  "Take 1 tablet (40 mg) by mouth once daily. 90 tablet 1    montelukast (Singulair) 10 mg tablet Take 1 tablet (10 mg) by mouth once daily. 90 tablet 1    pioglitazone (Actos) 30 mg tablet Take 1 tablet (30 mg) by mouth once daily. 30 tablet 3    semaglutide (Rybelsus) 3 mg tablet Take 1 tablet (3 mg) by mouth once daily. 30 tablet 1    simvastatin (Zocor) 40 mg tablet Take 1 tablet (40 mg) by mouth once daily in the evening. 90 tablet 1     No current facility-administered medications on file prior to visit.       Allergies   Allergen Reactions    Ibuprofen Swelling    Naproxen Swelling    Nsaids (Non-Steroidal Anti-Inflammatory Drug) Hives and Swelling       Visit Vitals  /70   Pulse 88   Ht 1.676 m (5' 6\")   Wt (!) 171 kg (378 lb)   SpO2 97%   BMI 61.01 kg/m²   OB Status Postmenopausal   Smoking Status Never   BSA 2.82 m²         PHYSICAL EXAM:  Alert and oriented x3.  Eyes: EOM grossly intact  Neck supple without lymph adenopathy or carotid bruit.  No masses or thyromegaly  Heart regular rate and rhythm without murmur.  Lungs clear to auscultation.  Legs without edema.  Gait is non-antalgic  Speech clear.  Hearing adequate.          DIAGNOSIS/PLAN:    1. Hyperlipidemia, unspecified hyperlipidemia type  - simvastatin (Zocor) 40 mg tablet; Take 1 tablet (40 mg) by mouth once daily in the evening.  Dispense: 90 tablet; Refill: 1    2. Primary hypertension  - lisinopril 40 mg tablet; Take 1 tablet (40 mg) by mouth once daily.  Dispense: 90 tablet; Refill: 1    3. Environmental and seasonal allergies  - montelukast (Singulair) 10 mg tablet; Take 1 tablet (10 mg) by mouth once daily.  Dispense: 90 tablet; Refill: 1    4. Hypothyroidism, unspecified type  - levothyroxine (Synthroid, Levoxyl) 112 mcg tablet; Take 1 tablet (112 mcg) by mouth once daily.  Dispense: 90 tablet; Refill: 1    5. Vitamin D deficiency  - ergocalciferol (Vitamin D-2) 1.25 MG (77663 UT) capsule; Take 1 capsule (50,000 Units) by mouth 1 (one) " time per week.  Dispense: 13 capsule; Refill: 3    6. Diabetes mellitus due to underlying condition with stage 3b chronic kidney disease, without long-term current use of insulin (CMS/[facility])  Referred back to Pharm D--new insurance   Meds will be discussed at this visit   - POCT glycosylated hemoglobin (Hb A1C) manually resulted    7. Type 2 diabetes mellitus without complication, without long-term current use of insulin (CMS/[facility])  DM2:   Aware of diabetes goals including hemoglobin A1c less than 7%, blood pressure less than 130/80, and LDL cholesterol less than 70.   Discussed importance of daily exercise as well as following an American Diabetes Association diet for best control.  Also discussed potential complications due to uncontrolled diabetes including heart attack, stroke, kidney failure, peripheral neuropathy, impaired vision and amputations. We recommend daily skin checks to look for new open areas . We recommend regular visits to a dentist,  podiatrist and an optometrist.        8. Seasonal asthma  - montelukast (Singulair) 10 mg tablet; Take 1 tablet (10 mg) by mouth once daily.  Dispense: 90 tablet; Refill: 1    9. Generalized edema  - hydroCHLOROthiazide (HYDRODiuril) 25 mg tablet; Take 1 tablet (25 mg) by mouth once daily as needed (edema).  Dispense: 30 tablet; Refill: 5    10. Arthritis  - cyclobenzaprine (Flexeril) 5 mg tablet; Take 1 tablet (5 mg) by mouth 3 times a day as needed for muscle spasms.  Dispense: 30 tablet; Refill: 1    11. Stage 3b chronic kidney disease (CKD) (CMS/[facility])  CMP ordered    12. Morbid obesity with BMI of 60.0-69.9, adult (CMS/[facility])  Patient encouraged to follow diet of lower carbohydrates and increase vegetable and fruit intake.   Patient also encouraged to increase water intake to 80 ounces/day.   Start or continue exercise for at least 30 minutes a day on most days of the week.     offers various ways to learn about healthy eating and healthy weight loss.          Medications refills will be completed as discussed.     Any labs or testing that is ordered will be reviewed and the results will be in your chart .   You can review these via  OSIX.     Follow up six months for complete physical exam.    Prescriptions will not be filled unless you are compliant with your follow-up appointments or have a follow-up appointment scheduled as per the instruction of your provider. Refills for medications should be requested at the time of your office visit.     Please allow one week for refill requests to be completed.     Contact office with any questions or concerns.   Preferred communication is via  OSIX  Please contact [email or web address] if having issues with  OSIX    Jenny Mcknight APRDUC-[facility]  [address], [address]  [place] [address]  Phone: [phone]    **Charting was completed using voice recognition technology and may include unintended errors**

## 2024-03-26 ENCOUNTER — LAB (OUTPATIENT)
Dept: LAB | Facility: LAB | Age: 59
End: 2024-03-26
Payer: COMMERCIAL

## 2024-03-26 DIAGNOSIS — E11.9 TYPE 2 DIABETES MELLITUS WITHOUT COMPLICATION, WITHOUT LONG-TERM CURRENT USE OF INSULIN (MULTI): ICD-10-CM

## 2024-03-26 DIAGNOSIS — I10 PRIMARY HYPERTENSION: ICD-10-CM

## 2024-03-26 DIAGNOSIS — E78.49 OTHER HYPERLIPIDEMIA: ICD-10-CM

## 2024-03-26 DIAGNOSIS — E08.22 DIABETES MELLITUS DUE TO UNDERLYING CONDITION WITH STAGE 3B CHRONIC KIDNEY DISEASE, WITHOUT LONG-TERM CURRENT USE OF INSULIN (MULTI): ICD-10-CM

## 2024-03-26 DIAGNOSIS — N18.32 STAGE 3B CHRONIC KIDNEY DISEASE (CKD) (MULTI): ICD-10-CM

## 2024-03-26 DIAGNOSIS — E03.9 HYPOTHYROIDISM, UNSPECIFIED TYPE: ICD-10-CM

## 2024-03-26 DIAGNOSIS — E66.01 MORBID OBESITY WITH BMI OF 60.0-69.9, ADULT (MULTI): ICD-10-CM

## 2024-03-26 DIAGNOSIS — N18.32 DIABETES MELLITUS DUE TO UNDERLYING CONDITION WITH STAGE 3B CHRONIC KIDNEY DISEASE, WITHOUT LONG-TERM CURRENT USE OF INSULIN (MULTI): ICD-10-CM

## 2024-03-26 LAB
ALBUMIN SERPL BCP-MCNC: 4 G/DL (ref 3.4–5)
ALP SERPL-CCNC: 58 U/L (ref 33–110)
ALT SERPL W P-5'-P-CCNC: 23 U/L (ref 7–45)
ANION GAP SERPL CALC-SCNC: 18 MMOL/L (ref 10–20)
AST SERPL W P-5'-P-CCNC: 23 U/L (ref 9–39)
BILIRUB SERPL-MCNC: 1.2 MG/DL (ref 0–1.2)
BUN SERPL-MCNC: 17 MG/DL (ref 6–23)
CALCIUM SERPL-MCNC: 9.4 MG/DL (ref 8.6–10.6)
CHLORIDE SERPL-SCNC: 106 MMOL/L (ref 98–107)
CHOLEST SERPL-MCNC: 145 MG/DL (ref 0–199)
CHOLESTEROL/HDL RATIO: 4.5
CO2 SERPL-SCNC: 22 MMOL/L (ref 21–32)
CREAT SERPL-MCNC: 1.04 MG/DL (ref 0.5–1.05)
CREAT UR-MCNC: 170.6 MG/DL (ref 20–320)
EGFRCR SERPLBLD CKD-EPI 2021: 63 ML/MIN/1.73M*2
GLUCOSE SERPL-MCNC: 153 MG/DL (ref 74–99)
HDLC SERPL-MCNC: 32.1 MG/DL
LDLC SERPL CALC-MCNC: 80 MG/DL
MICROALBUMIN UR-MCNC: 12.3 MG/L
MICROALBUMIN/CREAT UR: 7.2 UG/MG CREAT
NON HDL CHOLESTEROL: 113 MG/DL (ref 0–149)
POTASSIUM SERPL-SCNC: 4.3 MMOL/L (ref 3.5–5.3)
PROT SERPL-MCNC: 6.8 G/DL (ref 6.4–8.2)
SODIUM SERPL-SCNC: 142 MMOL/L (ref 136–145)
T4 FREE SERPL-MCNC: 1.51 NG/DL (ref 0.78–1.48)
TRIGL SERPL-MCNC: 163 MG/DL (ref 0–149)
TSH SERPL-ACNC: 0.12 MIU/L (ref 0.44–3.98)
VLDL: 33 MG/DL (ref 0–40)

## 2024-03-26 PROCEDURE — 84439 ASSAY OF FREE THYROXINE: CPT

## 2024-03-26 PROCEDURE — 80061 LIPID PANEL: CPT

## 2024-03-26 PROCEDURE — 82570 ASSAY OF URINE CREATININE: CPT

## 2024-03-26 PROCEDURE — 82043 UR ALBUMIN QUANTITATIVE: CPT

## 2024-03-26 PROCEDURE — 80053 COMPREHEN METABOLIC PANEL: CPT

## 2024-03-26 PROCEDURE — 84443 ASSAY THYROID STIM HORMONE: CPT

## 2024-03-26 PROCEDURE — 36415 COLL VENOUS BLD VENIPUNCTURE: CPT

## 2024-03-27 ENCOUNTER — APPOINTMENT (OUTPATIENT)
Dept: PRIMARY CARE | Facility: CLINIC | Age: 59
End: 2024-03-27
Payer: COMMERCIAL

## 2024-04-03 ENCOUNTER — TELEMEDICINE (OUTPATIENT)
Dept: PRIMARY CARE | Facility: CLINIC | Age: 59
End: 2024-04-03
Payer: COMMERCIAL

## 2024-04-03 DIAGNOSIS — M19.90 ARTHRITIS: ICD-10-CM

## 2024-04-03 DIAGNOSIS — J06.9 UPPER RESPIRATORY TRACT INFECTION, UNSPECIFIED TYPE: Primary | ICD-10-CM

## 2024-04-03 PROBLEM — Z86.39 HISTORY OF DIABETES MELLITUS: Status: ACTIVE | Noted: 2024-04-03

## 2024-04-03 PROBLEM — Z86.39 HISTORY OF ELEVATED LIPIDS: Status: ACTIVE | Noted: 2024-04-03

## 2024-04-03 PROBLEM — E88.810 METABOLIC SYNDROME: Status: ACTIVE | Noted: 2024-04-03

## 2024-04-03 PROBLEM — R06.83 SNORING: Status: ACTIVE | Noted: 2024-04-03

## 2024-04-03 PROBLEM — S83.209A TEAR OF MENISCUS OF KNEE: Status: ACTIVE | Noted: 2024-04-03

## 2024-04-03 PROBLEM — R60.1 GENERALIZED EDEMA: Status: ACTIVE | Noted: 2024-04-03

## 2024-04-03 PROBLEM — M54.12 CERVICAL RADICULOPATHY: Status: ACTIVE | Noted: 2024-04-03

## 2024-04-03 PROBLEM — K21.9 GASTROESOPHAGEAL REFLUX DISEASE: Status: ACTIVE | Noted: 2024-04-03

## 2024-04-03 PROCEDURE — 99213 OFFICE O/P EST LOW 20 MIN: CPT | Performed by: NURSE PRACTITIONER

## 2024-04-03 PROCEDURE — 4010F ACE/ARB THERAPY RXD/TAKEN: CPT | Performed by: NURSE PRACTITIONER

## 2024-04-03 PROCEDURE — 3061F NEG MICROALBUMINURIA REV: CPT | Performed by: NURSE PRACTITIONER

## 2024-04-03 PROCEDURE — 3048F LDL-C <100 MG/DL: CPT | Performed by: NURSE PRACTITIONER

## 2024-04-03 PROCEDURE — 3008F BODY MASS INDEX DOCD: CPT | Performed by: NURSE PRACTITIONER

## 2024-04-03 RX ORDER — AMOXICILLIN AND CLAVULANATE POTASSIUM 875; 125 MG/1; MG/1
875 TABLET, FILM COATED ORAL 2 TIMES DAILY
Qty: 20 TABLET | Refills: 0 | Status: SHIPPED | OUTPATIENT
Start: 2024-04-03 | End: 2024-04-13

## 2024-04-03 RX ORDER — BENZONATATE 100 MG/1
100 CAPSULE ORAL 3 TIMES DAILY PRN
Qty: 30 CAPSULE | Refills: 1 | Status: SHIPPED | OUTPATIENT
Start: 2024-04-03 | End: 2024-05-03

## 2024-04-03 NOTE — PROGRESS NOTES
Subjective   Patient ID: Alicia Cotto is a 58 y.o. female who presents for Cough.    Virtual or Telephone Consent    An interactive audio and video telecommunication system which permits real time communications between the patient (at the originating site) and provider (at the distant site) was utilized to provide this telehealth service.   Verbal consent was requested and obtained from Alicia Cotto on this date, 04/03/24 for a telehealth visit.     HPI     Presents today for acute care visit via telehealth:     Reports the following symptoms for  1 week     Headache/Sinus pressure: denies  Fever/chills: denies  Nasal congestion/post nasal drip: denies  Cough: yes-can't get rid of it   Sputum: thick light yellow green   Sore throat: denies   Shortness of breath: needing rescue inhaler   Drinking to stay hydrated: yes    Nicotine use: denies    Patient has taken:   Rescue inhaler  Mucinex DM       Arthritis:   States that since starting the HCTZ her joints feel so much better. Feels that she is able to move easier.     Review of Systems    Objective   There were no vitals taken for this visit.    Physical Exam    Alert and oriented x 3  Appears mildly ill  Does not appear/sound dyspneic with conversation  Harsh cough  Speech clear.  Hearing adequate.  Psych: Normal affect. Good judgment and insight.       Assessment/Plan     1. Upper respiratory tract infection, unspecified type    Complete prescribed antibiotics as directed. Eat yogurt or take a probiotic (not within the hour of taking the antibiotic) while taking antibiotics.   Increase fluid intake throughout the day.    Irrigate your nose with saline spray 2-4 times per day.   Antihistamine nasal sprays (like Azelastine) also help with nasal congestion and sinus infection. Side effects of Azelastine include an occasional bitter taste. You can reduce the bitter taste by leaning forward, directing the spray toward the outside of your nose, and not sniffing for a  few minutes.    Mucinex  DM for cough  Contact the office if not improving after completing the antibiotics.   Utilize your Proair at least 3 times a day until improving  - amoxicillin-pot clavulanate (Augmentin) 875-125 mg tablet; Take 1 tablet (875 mg) by mouth 2 times a day for 10 days.  Dispense: 20 tablet; Refill: 0  - benzonatate (Tessalon) 100 mg capsule; Take 1 capsule (100 mg) by mouth 3 times a day as needed for cough. Do not crush or chew.  Dispense: 30 capsule; Refill: 1    2. Arthritis  Continue HCTZ   Improved    Follow up: Contact the office if not improving after completing the antibiotics.     Medications refills will be completed as discussed.     Any labs or testing that is ordered will be reviewed and the results will be in your chart .   You can review these via  Social Pulse.     Prescriptions will not be filled unless you are compliant with your follow-up appointments or have a follow-up appointment scheduled as per the instruction of your provider. Refills for medications should be requested at the time of your office visit.     Please allow one week for refill requests to be completed.     Contact office with any questions or concerns.   Preferred communication is via  Social Pulse      Call  Services: [phone] to assist with scheduling.      Jenny KIMBROUGH-[facility]  [address], [address]  [place] [address]  Phone: [phone]    **Charting was completed using voice recognition technology and may include unintended errors**

## 2024-04-25 ENCOUNTER — TELEPHONE (OUTPATIENT)
Dept: PRIMARY CARE | Facility: CLINIC | Age: 59
End: 2024-04-25
Payer: COMMERCIAL

## 2024-04-25 DIAGNOSIS — E11.9 TYPE 2 DIABETES MELLITUS WITHOUT COMPLICATION, WITHOUT LONG-TERM CURRENT USE OF INSULIN (MULTI): Primary | ICD-10-CM

## 2024-04-25 DIAGNOSIS — E66.01 MORBID OBESITY WITH BMI OF 60.0-69.9, ADULT (MULTI): ICD-10-CM

## 2024-04-25 NOTE — TELEPHONE ENCOUNTER
Pt is calling stating that at her March you stated that you would put in a new referral for the Pharmacy for her again since she got new insurance and they will call to schedule her. Pt states that no one called to schedule an appointment and she stated that you wanted her to start a new medication and they would discuss this with her. Pt is asking if you can reach out to them and see what is going on or if someone can call her to schedule her appointment?

## 2024-04-27 ENCOUNTER — TELEPHONE (OUTPATIENT)
Dept: PRIMARY CARE | Facility: CLINIC | Age: 59
End: 2024-04-27
Payer: COMMERCIAL

## 2024-04-27 DIAGNOSIS — E11.9 TYPE 2 DIABETES MELLITUS WITHOUT COMPLICATION, WITHOUT LONG-TERM CURRENT USE OF INSULIN (MULTI): ICD-10-CM

## 2024-04-27 RX ORDER — PIOGLITAZONEHYDROCHLORIDE 30 MG/1
30 TABLET ORAL DAILY
Qty: 30 TABLET | Refills: 0 | Status: SHIPPED | OUTPATIENT
Start: 2024-04-27 | End: 2024-06-01 | Stop reason: SDUPTHER

## 2024-04-29 ENCOUNTER — HOSPITAL ENCOUNTER (EMERGENCY)
Facility: HOSPITAL | Age: 59
Discharge: LEFT BEFORE DISCHARGE | End: 2024-04-29
Payer: COMMERCIAL

## 2024-04-29 PROCEDURE — 4500999001 HC ED NO CHARGE: Performed by: EMERGENCY MEDICINE

## 2024-05-03 ENCOUNTER — PATIENT OUTREACH (OUTPATIENT)
Dept: CARE COORDINATION | Facility: CLINIC | Age: 59
End: 2024-05-03
Payer: COMMERCIAL

## 2024-05-03 ENCOUNTER — TELEPHONE (OUTPATIENT)
Dept: PRIMARY CARE | Facility: CLINIC | Age: 59
End: 2024-05-03
Payer: COMMERCIAL

## 2024-05-03 NOTE — PROGRESS NOTES
Discharge Facility:[facility]  Discharge Diagnosis:GI Bleed  Admission Date:4/29/2024  Discharge Date: 5/2/2024    PCP Appointment Date:TBD  Specialist Appointment Date: TBD  Hospital Encounter and Summary: Linked  *2 attempts were made to outreach the patient, unsuccessful.

## 2024-05-03 NOTE — TELEPHONE ENCOUNTER
----- Message from Kim Vang MA sent at 5/3/2024  4:22 PM EDT -----  Regarding: TCM No contact  This patient was discharged from:[facility]  Discharge diagnosis:  GI Bleed   Date of discharge:  5/2/2024         No PCP appointments available within 14 days of discharge  Please reach out to patient and schedule an appointment by: 5/16/2024    Thank You!

## 2024-05-06 NOTE — TELEPHONE ENCOUNTER
Alicia Watson called to see if you want her to be seen by you.  She was in Hospital Monday thru Thursday last week she said they told her she has colitis.  They told her to follow up and to get a colonoscopy.  Please advise.  She can be reached at

## 2024-05-17 ENCOUNTER — PATIENT OUTREACH (OUTPATIENT)
Dept: PRIMARY CARE | Facility: CLINIC | Age: 59
End: 2024-05-17
Payer: COMMERCIAL

## 2024-05-17 NOTE — PROGRESS NOTES
Unable to reach patient for call back after patient's follow up appointment with PCP.   YESENIAM with call back number for patient to call if needed   If no voicemail available call attempts x 2 were made to contact the patient to assist with any questions or concerns patient may have.

## 2024-05-22 ENCOUNTER — TELEPHONE (OUTPATIENT)
Dept: PRIMARY CARE | Facility: CLINIC | Age: 59
End: 2024-05-22
Payer: COMMERCIAL

## 2024-05-22 NOTE — TELEPHONE ENCOUNTER
Pt is calling she states that you put a referral in the system for Cardiology and pt was sched, but had to cancel her appt and it cx'd out the referral can you nplease put in a new referral    no

## 2024-05-23 ENCOUNTER — TELEMEDICINE (OUTPATIENT)
Dept: PHARMACY | Facility: HOSPITAL | Age: 59
End: 2024-05-23
Payer: COMMERCIAL

## 2024-05-23 DIAGNOSIS — E08.22 DIABETES MELLITUS DUE TO UNDERLYING CONDITION WITH STAGE 3B CHRONIC KIDNEY DISEASE, WITHOUT LONG-TERM CURRENT USE OF INSULIN (MULTI): Primary | ICD-10-CM

## 2024-05-23 DIAGNOSIS — E66.01 MORBID OBESITY WITH BMI OF 60.0-69.9, ADULT (MULTI): ICD-10-CM

## 2024-05-23 DIAGNOSIS — I10 PRIMARY HYPERTENSION: ICD-10-CM

## 2024-05-23 DIAGNOSIS — E11.9 TYPE 2 DIABETES MELLITUS WITHOUT COMPLICATION, UNSPECIFIED WHETHER LONG TERM INSULIN USE (MULTI): ICD-10-CM

## 2024-05-23 DIAGNOSIS — E11.9 TYPE 2 DIABETES MELLITUS WITHOUT COMPLICATION, WITHOUT LONG-TERM CURRENT USE OF INSULIN (MULTI): ICD-10-CM

## 2024-05-23 DIAGNOSIS — N18.32 STAGE 3B CHRONIC KIDNEY DISEASE (CKD) (MULTI): ICD-10-CM

## 2024-05-23 DIAGNOSIS — N18.32 DIABETES MELLITUS DUE TO UNDERLYING CONDITION WITH STAGE 3B CHRONIC KIDNEY DISEASE, WITHOUT LONG-TERM CURRENT USE OF INSULIN (MULTI): Primary | ICD-10-CM

## 2024-05-23 DIAGNOSIS — E78.5 HYPERLIPIDEMIA, UNSPECIFIED HYPERLIPIDEMIA TYPE: Primary | ICD-10-CM

## 2024-05-23 NOTE — PROGRESS NOTES
Patient ID: Alicia Cotto is a 59 y.o. female who presents for Diabetes.    Referring Provider: Jenny Mcknight APRN-*  PCP: LUIS E Nelson-CNP Last visit with PCP: 4/3/24 Next visit with PCP: 10/3/24      Subjective   Treatment Adherence:   Preferred pharmacy: [facility]   Can patient afford prescribed medications: Patient can afford current medications but SGLT2 and GLP1 were very expensive on her old insurance     Diabetes  She presents for her initial diabetic visit. She has type 2 diabetes mellitus. Her disease course has been stable. There are no hypoglycemic associated symptoms. There are no diabetic associated symptoms. Current diabetic treatment includes oral agent (monotherapy). She is compliant with treatment all of the time.     Current DM Regimen  Pioglitazone 30 m tablet once daily    Past DM Regimen  Jardiance: discontinued due to cost  Rybelsus: discontinued due to cost   Metformin: discontinued due to GI side effects  Glyburide: discontinued due to hypoglycemia and numbness of lips  Invokana: switched to Jardiance due to insurance preference     DISCUSSION  Patient was previously working with pharmacy for Rybelsus initiation and titration however, the medication was very expensive and patient preferred to put this on hold until she switched insurances   Patient has since self discontinued Jardiance due to cost and has only been on pioglitazone this whole year   Last A1c completed 2 months ago: 6.7%  Compared to 6.1% 7 months ago  Patient is concerned about pill burden and would prefer not to restart Rybelsus especially since recent ED visit due to abdominal pain/GI issues   Advised that given patient's well controlled blood glucose, there is no immediate need to restart on GLP1 at this time especially as it may also cause GI upset however, patient may benefit from restarting Jardiance if not cost prohibitive for renal protection  eGFR=63 (3/26/24)  Patient to  reconsider GLP1 for cardiometabolic benefit and weight loss potential in the future   Will run test claims through new insurance to determine coverage    Objective     There were no vitals taken for this visit.   BP Readings from Last 4 Encounters:   03/20/24 128/70   09/27/23 134/72   03/29/23 130/79   08/24/22 126/78      There were no vitals filed for this visit.     Labs  Lab Results   Component Value Date    BILITOT 1.2 03/26/2024    CALCIUM 9.4 03/26/2024    CO2 22 03/26/2024     03/26/2024    CREATININE 1.04 03/26/2024    GLUCOSE 153 (H) 03/26/2024    ALKPHOS 58 03/26/2024    K 4.3 03/26/2024    PROT 6.8 03/26/2024     03/26/2024    AST 23 03/26/2024    ALT 23 03/26/2024    BUN 17 03/26/2024    ANIONGAP 18 03/26/2024    ALBUMIN 4.0 03/26/2024    AMYLASE 21 (L) 01/15/2018    LIPASE 15 01/15/2018    GFRF 41 (A) 03/30/2023     Lab Results   Component Value Date    TRIG 163 (H) 03/26/2024    CHOL 145 03/26/2024    LDLCALC 80 03/26/2024    HDL 32.1 03/26/2024     Lab Results   Component Value Date    HGBA1C 6.7 (A) 03/20/2024       Current Outpatient Medications   Medication Instructions    albuterol 90 mcg/actuation inhaler 2 puffs, inhalation, Every 4-6 hours as needed.    clobetasol (Olux) 0.05 % topical foam Topical, 2 times daily    cyclobenzaprine (FLEXERIL) 5 mg, oral, 3 times daily PRN    diclofenac sodium (Voltaren) 1 % gel gel Apply 4 grams to affected knee 3-4 x daily    empagliflozin (JARDIANCE) 25 mg, oral, Daily    ergocalciferol (VITAMIN D-2) 50,000 Units, oral, Once Weekly    hydroCHLOROthiazide (HYDRODIURIL) 25 mg, oral, Daily PRN    levothyroxine (SYNTHROID, LEVOXYL) 112 mcg, oral, Daily    lisinopril 40 mg, oral, Daily    montelukast (SINGULAIR) 10 mg, oral, Daily    pioglitazone (ACTOS) 30 mg, oral, Daily    simvastatin (ZOCOR) 40 mg, oral, Every evening         Drug Interactions;  None at time of review    Assessment/Plan   Problem List Items Addressed This Visit              ICD-10-CM    Type 2 diabetes mellitus without complication, without long-term current use of insulin (Multi) E11.9     Diabetes is controlled with A1c of 6.7% (goal <7%) however previous reading 7 months ago was even better controlled at 6.1% likely due to patient's self-discontinuation of Jardiance   CONTINUE pioglitazone 30 mg once daily  Will run test claim to determine if Jardiance if affordable at this time. Recommend to restart if possible for renal protection and glycemic control         Morbid obesity with BMI of 60.0-69.9, adult (Multi) E66.01, Z68.44         Follow-up: 1 week   -------------    Time spent with pt: Total length of time 15 (minutes) of the encounter and more than 50% was spent counseling the patient.    Rosana Shannon, PharmD    Continue all meds under the continuation of care with the referring provider and clinical pharmacy team.

## 2024-05-24 ASSESSMENT — ENCOUNTER SYMPTOMS: DIABETIC ASSOCIATED SYMPTOMS: 0

## 2024-05-24 NOTE — ASSESSMENT & PLAN NOTE
Diabetes is controlled with A1c of 6.7% (goal <7%) however previous reading 7 months ago was even better controlled at 6.1% likely due to patient's self-discontinuation of Jardiance   CONTINUE pioglitazone 30 mg once daily  Will run test claim to determine if Jardiance if affordable at this time. Recommend to restart if possible for renal protection and glycemic control

## 2024-05-28 ENCOUNTER — TELEMEDICINE (OUTPATIENT)
Dept: PHARMACY | Facility: HOSPITAL | Age: 59
End: 2024-05-28
Payer: COMMERCIAL

## 2024-05-28 DIAGNOSIS — E11.9 TYPE 2 DIABETES MELLITUS WITHOUT COMPLICATION, WITHOUT LONG-TERM CURRENT USE OF INSULIN (MULTI): Primary | ICD-10-CM

## 2024-05-28 PROCEDURE — RXMED WILLOW AMBULATORY MEDICATION CHARGE

## 2024-05-28 NOTE — PROGRESS NOTES
"      Patient ID: Alicia Cotto is a 59 y.o. female who presents for No chief complaint on file..    Referring Provider: Jenny Mcknight APRN-*  PCP: LUIS E Nelson-CNP Last visit with PCP: 4/3/24 Next visit with PCP: 10/3/24       Subjective   Treatment Adherence:   Preferred pharmacy: [facility]   Can patient afford prescribed medications: Patient can afford current medications but SGLT2 and GLP1 were very expensive on her old insurance      Diabetes  She presents for her initial diabetic visit. She has type 2 diabetes mellitus. Her disease course has been stable. There are no hypoglycemic associated symptoms. There are no diabetic associated symptoms. Current diabetic treatment includes oral agent (monotherapy). She is compliant with treatment all of the time.      Current DM Regimen  Pioglitazone 30 m tablet once daily     Past DM Regimen  Jardiance: discontinued due to cost  Rybelsus: discontinued due to cost   Metformin: discontinued due to GI side effects  Glyburide: discontinued due to hypoglycemia and numbness of lips  Invokana: switched to Jardiance due to insurance preference     DISCUSSION    Test claim run through insurance shows Jardiance is $0/month  Called patient and advised to restart medication as cost is no concern now due to glycemic and renal benefits  Patient is agreeable to starting but would like to replace her pioglitazone  Counseled on side effects of Jardiance and \"sick day rules\"  Patient has no further questions or concerns     Objective     There were no vitals taken for this visit.   BP Readings from Last 4 Encounters:   24 128/70   23 134/72   23 130/79   22 126/78      There were no vitals filed for this visit.     Labs  Lab Results   Component Value Date    BILITOT 1.2 2024    CALCIUM 9.4 2024    CO2 22 2024     2024    CREATININE 1.04 2024    GLUCOSE 153 (H) 2024    ALKPHOS 58 " 03/26/2024    K 4.3 03/26/2024    PROT 6.8 03/26/2024     03/26/2024    AST 23 03/26/2024    ALT 23 03/26/2024    BUN 17 03/26/2024    ANIONGAP 18 03/26/2024    ALBUMIN 4.0 03/26/2024    AMYLASE 21 (L) 01/15/2018    LIPASE 15 01/15/2018    GFRF 41 (A) 03/30/2023     Lab Results   Component Value Date    TRIG 163 (H) 03/26/2024    CHOL 145 03/26/2024    LDLCALC 80 03/26/2024    HDL 32.1 03/26/2024     Lab Results   Component Value Date    HGBA1C 6.7 (A) 03/20/2024       Current Outpatient Medications   Medication Instructions    albuterol 90 mcg/actuation inhaler 2 puffs, inhalation, Every 4-6 hours as needed.    clobetasol (Olux) 0.05 % topical foam Topical, 2 times daily    cyclobenzaprine (FLEXERIL) 5 mg, oral, 3 times daily PRN    diclofenac sodium (Voltaren) 1 % gel gel Apply 4 grams to affected knee 3-4 x daily    empagliflozin (JARDIANCE) 25 mg, oral, Daily    ergocalciferol (VITAMIN D-2) 50,000 Units, oral, Once Weekly    hydroCHLOROthiazide (HYDRODIURIL) 25 mg, oral, Daily PRN    levothyroxine (SYNTHROID, LEVOXYL) 112 mcg, oral, Daily    lisinopril 40 mg, oral, Daily    montelukast (SINGULAIR) 10 mg, oral, Daily    pioglitazone (ACTOS) 30 mg, oral, Daily    simvastatin (ZOCOR) 40 mg, oral, Every evening         Assessment/Plan     RESTART Jardiance 25 mg once daily  DISCONTINUE pioglitazone   Monitor fasting blood glucose. If FBG consistently >130, will reconsider adding back pioglitazone       Follow-up: 6/25     Time spent with pt: Total length of time 15 (minutes) of the encounter and more than 50% was spent counseling the patient.    Rosana Shannon, PharmD    Continue all meds under the continuation of care with the referring provider and clinical pharmacy team.

## 2024-06-01 ENCOUNTER — TELEPHONE (OUTPATIENT)
Dept: PRIMARY CARE | Facility: CLINIC | Age: 59
End: 2024-06-01
Payer: COMMERCIAL

## 2024-06-01 DIAGNOSIS — E11.9 TYPE 2 DIABETES MELLITUS WITHOUT COMPLICATION, WITHOUT LONG-TERM CURRENT USE OF INSULIN (MULTI): ICD-10-CM

## 2024-06-01 RX ORDER — PIOGLITAZONEHYDROCHLORIDE 30 MG/1
30 TABLET ORAL DAILY
Qty: 30 TABLET | Refills: 0 | Status: SHIPPED | OUTPATIENT
Start: 2024-06-01 | End: 2024-09-29

## 2024-06-01 NOTE — TELEPHONE ENCOUNTER
LE pt- can't wait    Refill Actos 30mg 1 every day    Pharm[facility]: [facility]     LR: 04/27/24  LV: 04/03/24  NV: 10/03/24

## 2024-06-05 ENCOUNTER — PHARMACY VISIT (OUTPATIENT)
Dept: PHARMACY | Facility: CLINIC | Age: 59
End: 2024-06-05
Payer: COMMERCIAL

## 2024-06-07 ENCOUNTER — TELEPHONE (OUTPATIENT)
Dept: PRIMARY CARE | Facility: CLINIC | Age: 59
End: 2024-06-07
Payer: COMMERCIAL

## 2024-06-07 DIAGNOSIS — J30.89 ENVIRONMENTAL AND SEASONAL ALLERGIES: Primary | ICD-10-CM

## 2024-06-07 NOTE — TELEPHONE ENCOUNTER
REFILL REQUEST    Med: Albuterol  Med Dose: 2.5 mg/3 mL  Med Frequency: inhale 3 mL every 4 hours as needed    Pharmacy:   Pharmacy Address: [id number] in [place]     LR: discontinued 3/20/24  LV: 03/20/2024  NV: 10/03/2024

## 2024-06-10 RX ORDER — ALBUTEROL SULFATE 90 UG/1
2 AEROSOL, METERED RESPIRATORY (INHALATION) EVERY 4 HOURS PRN
Qty: 18 G | Refills: 2 | Status: SHIPPED | OUTPATIENT
Start: 2024-06-10

## 2024-06-14 ENCOUNTER — APPOINTMENT (OUTPATIENT)
Dept: CARDIOLOGY | Facility: CLINIC | Age: 59
End: 2024-06-14
Payer: COMMERCIAL

## 2024-06-14 VITALS
DIASTOLIC BLOOD PRESSURE: 80 MMHG | WEIGHT: 293 LBS | HEIGHT: 67 IN | BODY MASS INDEX: 45.99 KG/M2 | SYSTOLIC BLOOD PRESSURE: 122 MMHG | TEMPERATURE: 97.5 F | HEART RATE: 87 BPM

## 2024-06-14 DIAGNOSIS — I10 PRIMARY HYPERTENSION: ICD-10-CM

## 2024-06-14 DIAGNOSIS — R06.02 EXERTIONAL SHORTNESS OF BREATH: Primary | ICD-10-CM

## 2024-06-14 DIAGNOSIS — E78.5 HYPERLIPIDEMIA, UNSPECIFIED HYPERLIPIDEMIA TYPE: ICD-10-CM

## 2024-06-14 DIAGNOSIS — E66.01 MORBID OBESITY WITH BMI OF 60.0-69.9, ADULT (MULTI): ICD-10-CM

## 2024-06-14 DIAGNOSIS — E11.9 TYPE 2 DIABETES MELLITUS WITHOUT COMPLICATION, WITHOUT LONG-TERM CURRENT USE OF INSULIN (MULTI): ICD-10-CM

## 2024-06-14 DIAGNOSIS — Z01.810 PREOP CARDIOVASCULAR EXAM: ICD-10-CM

## 2024-06-14 PROCEDURE — 1036F TOBACCO NON-USER: CPT | Performed by: INTERNAL MEDICINE

## 2024-06-14 PROCEDURE — 3079F DIAST BP 80-89 MM HG: CPT | Performed by: INTERNAL MEDICINE

## 2024-06-14 PROCEDURE — 3074F SYST BP LT 130 MM HG: CPT | Performed by: INTERNAL MEDICINE

## 2024-06-14 PROCEDURE — 4010F ACE/ARB THERAPY RXD/TAKEN: CPT | Performed by: INTERNAL MEDICINE

## 2024-06-14 PROCEDURE — 3048F LDL-C <100 MG/DL: CPT | Performed by: INTERNAL MEDICINE

## 2024-06-14 PROCEDURE — 3008F BODY MASS INDEX DOCD: CPT | Performed by: INTERNAL MEDICINE

## 2024-06-14 PROCEDURE — 99204 OFFICE O/P NEW MOD 45 MIN: CPT | Performed by: INTERNAL MEDICINE

## 2024-06-14 PROCEDURE — 3061F NEG MICROALBUMINURIA REV: CPT | Performed by: INTERNAL MEDICINE

## 2024-06-14 RX ORDER — REGADENOSON 0.08 MG/ML
0.4 INJECTION, SOLUTION INTRAVENOUS
OUTPATIENT
Start: 2024-06-14

## 2024-06-14 RX ORDER — AMINOPHYLLINE 25 MG/ML
125 INJECTION, SOLUTION INTRAVENOUS ONCE AS NEEDED
OUTPATIENT
Start: 2024-06-14

## 2024-06-14 ASSESSMENT — PAIN SCALES - GENERAL: PAINLEVEL: 0-NO PAIN

## 2024-06-14 NOTE — PROGRESS NOTES
1.  Hypertension  2.  Hyperlipidemia  3.  Diabetes mellitus  4.  Hypothyroidism  5.  Asthma  6.  Obesity, BMI 58    Patient is a pleasant 59-year-old female with the above-noted pertinent past medical history who presents today as part of preoperative cardiovascular evaluation.  She states that she is contemplating a bariatric surgery likely in [place] and wishes to have cardiovascular evaluation,She states that she has shortness of breath with quarter-half a mile walking when previously (4-5 years ago) she had no difficulty with exertional shortness of breath, she denies any complaints of chest pains she has no complaints of orthopnea PND.     She states that she has shortness of breath with quarter-half a mile walking when previously (4-5 years ago) she had no difficulty with exertional shortness of breath, she denies any complaints of chest pains she has no complaints of orthopnea PND    Past surgical history  Left knee meniscus tear arthroscopic repair  Back surgery over 20 years ago  C-sections x 3    Family medical history  Father  60 years of age heavy smoking lung cancer  Mother 80 years of age hypertension and diabetes mellitus    Social history   mother of 3 children she has no history of smoking and does not consume alcoholic beverages had 4 years of college education    Allergies: List is reviewed as noted in the chart    Medication list is reviewed as noted in the chart    Review of system  Last eye examination in  bilateral cataract extractions with lens implants, she has had 1 cup of coffee daily and and recently has stopped all restaurant food other systems were reviewed and negative for complaints    Vital signs reviewed and stable, BMI 58.5, blood pressure 122/80 mmHg and a heart rate of 87 bpm patient is a well-developed well-nourished female in no acute distress speaking full sentences no carotid bruits heart rate and rhythm are regular S1-S2 are normal no gallop or murmurs,  lungs clear to auscultation no wheezing, abdomen is obese positive bowel sounds soft and nontender    March 2024  Total cholesterol 145, triglyceride 163, HDL 32, and LDL of 80  TSH 0.12    59-year-old female with multiple coronary disease risk factors including diabetes mellitus, hypertension, and hyperlipidemia as well as low level of exertion and dyspnea on exertion, recommend further ischemic evaluation including a Lexiscan nuclear test, patient is to return to my clinic after above test is completed.  Continued weight loss is recommended patient was congratulated on her current weight loss.  Hypertension under good control  Hyperlipidemia with low HDL which would improve with weight loss  Patient is a non-smoker

## 2024-06-25 ENCOUNTER — APPOINTMENT (OUTPATIENT)
Dept: PHARMACY | Facility: HOSPITAL | Age: 59
End: 2024-06-25
Payer: COMMERCIAL

## 2024-06-25 DIAGNOSIS — E11.9 TYPE 2 DIABETES MELLITUS WITHOUT COMPLICATION, WITHOUT LONG-TERM CURRENT USE OF INSULIN (MULTI): Primary | ICD-10-CM

## 2024-06-25 RX ORDER — BLOOD-GLUCOSE METER
KIT MISCELLANEOUS
Qty: 1 EACH | Refills: 0 | Status: SHIPPED | OUTPATIENT
Start: 2024-06-25

## 2024-06-25 RX ORDER — LANCETS 28 GAUGE
EACH MISCELLANEOUS
Qty: 100 EACH | Refills: 12 | Status: SHIPPED | OUTPATIENT
Start: 2024-06-25 | End: 2025-06-25

## 2024-06-25 RX ORDER — BLOOD-GLUCOSE METER
KIT MISCELLANEOUS
Qty: 50 EACH | Refills: 1 | Status: SHIPPED | OUTPATIENT
Start: 2024-06-25

## 2024-06-25 ASSESSMENT — ENCOUNTER SYMPTOMS: DIABETIC ASSOCIATED SYMPTOMS: 0

## 2024-06-25 NOTE — PROGRESS NOTES
Patient ID: Alicia Cotto is a 59 y.o. female who presents for Diabetes.    Referring Provider: Jenny Mcknight APRN-*  PCP: LUIS E Nelson-CNP Last visit with PCP: 4/3/24 Next visit with PCP: 10/3/24       Subjective   Treatment Adherence:   Patient did take medications today.   Number of missed doses in last 7 days: 0.      Preferred pharmacy: [facility] patient afford prescribed medications: Yes, Patient has no complaints    Diabetes  She presents for her follow-up diabetic visit. She has type 2 diabetes mellitus. Her disease course has been stable. There are no hypoglycemic associated symptoms. There are no diabetic associated symptoms. Current diabetic treatment includes oral agent (dual therapy).     DISCUSSION  Patient has started taking Jardiance 25 mg daily again since last visit  Denies any side effects  Patient continues to take pioglitazone daily as well but has expressed strong interest in discontinuing medication to lessen her pill burden   Explained that it is possible to discontinue pioglitazone if blood glucose remains at goal but patient would need to start monitoring blood glucose or wait until next A1c is drawn   Patient is agreeable to monitoring FBG a few times a week  Reviewed FBG goal of      Objective     There were no vitals taken for this visit.   BP Readings from Last 4 Encounters:   06/14/24 122/80   03/20/24 128/70   09/27/23 134/72   03/29/23 130/79      There were no vitals filed for this visit.     Labs  Lab Results   Component Value Date    BILITOT 1.2 03/26/2024    CALCIUM 9.4 03/26/2024    CO2 22 03/26/2024     03/26/2024    CREATININE 1.04 03/26/2024    GLUCOSE 153 (H) 03/26/2024    ALKPHOS 58 03/26/2024    K 4.3 03/26/2024    PROT 6.8 03/26/2024     03/26/2024    AST 23 03/26/2024    ALT 23 03/26/2024    BUN 17 03/26/2024    ANIONGAP 18 03/26/2024    ALBUMIN 4.0 03/26/2024    AMYLASE 21 (L) 01/15/2018    LIPASE 15 01/15/2018    GFRF 41 (A)  03/30/2023     Lab Results   Component Value Date    TRIG 163 (H) 03/26/2024    CHOL 145 03/26/2024    LDLCALC 80 03/26/2024    HDL 32.1 03/26/2024     Lab Results   Component Value Date    HGBA1C 6.7 (A) 03/20/2024       Current Outpatient Medications   Medication Instructions    albuterol 90 mcg/actuation inhaler 2 puffs, inhalation, Every 4 hours PRN, Every 4-6 hours as needed.    clobetasol (Olux) 0.05 % topical foam Topical, 2 times daily    cyclobenzaprine (FLEXERIL) 5 mg, oral, 3 times daily PRN    diclofenac sodium (Voltaren) 1 % gel gel Apply 4 grams to affected knee 3-4 x daily    ergocalciferol (VITAMIN D-2) 50,000 Units, oral, Once Weekly    hydroCHLOROthiazide (HYDRODIURIL) 25 mg, oral, Daily PRN    Jardiance 25 mg, oral, Daily    levothyroxine (SYNTHROID, LEVOXYL) 112 mcg, oral, Daily    lisinopril 40 mg, oral, Daily    montelukast (SINGULAIR) 10 mg, oral, Daily    pioglitazone (ACTOS) 30 mg, oral, Daily    simvastatin (ZOCOR) 40 mg, oral, Every evening       Assessment/Plan   Diabetes is controlled with A1c of 6.7% (goal <7%)  CONTINUE Jardiance 25 mg once daily  START checking fasting blood glucose for a baseline  Testing supplies sent to [facility] for delivery   TRIAL DISCONTINUING pioglitazone. IF FBG >130 after discontinuing, reconsider adding back medication           Follow-up: 7/23 to assess blood glucose control     Time spent with pt: Total length of time 15 (minutes) of the encounter and more than 50% was spent counseling the patient.    Rosana Shannon, PharmD    Continue all meds under the continuation of care with the referring provider and clinical pharmacy team.

## 2024-07-10 ENCOUNTER — HOSPITAL ENCOUNTER (OUTPATIENT)
Dept: CARDIOLOGY | Facility: CLINIC | Age: 59
Discharge: HOME | End: 2024-07-10
Payer: COMMERCIAL

## 2024-07-10 ENCOUNTER — HOSPITAL ENCOUNTER (OUTPATIENT)
Dept: RADIOLOGY | Facility: CLINIC | Age: 59
Discharge: HOME | End: 2024-07-10
Payer: COMMERCIAL

## 2024-07-10 DIAGNOSIS — I10 PRIMARY HYPERTENSION: ICD-10-CM

## 2024-07-10 DIAGNOSIS — R06.02 EXERTIONAL SHORTNESS OF BREATH: ICD-10-CM

## 2024-07-10 DIAGNOSIS — E66.01 MORBID OBESITY WITH BMI OF 60.0-69.9, ADULT (MULTI): ICD-10-CM

## 2024-07-10 DIAGNOSIS — E11.9 TYPE 2 DIABETES MELLITUS WITHOUT COMPLICATION, WITHOUT LONG-TERM CURRENT USE OF INSULIN (MULTI): ICD-10-CM

## 2024-07-10 DIAGNOSIS — E78.5 HYPERLIPIDEMIA, UNSPECIFIED HYPERLIPIDEMIA TYPE: ICD-10-CM

## 2024-07-10 DIAGNOSIS — Z01.810 PREOP CARDIOVASCULAR EXAM: ICD-10-CM

## 2024-07-10 PROCEDURE — 93017 CV STRESS TEST TRACING ONLY: CPT

## 2024-07-10 PROCEDURE — A9502 TC99M TETROFOSMIN: HCPCS | Performed by: INTERNAL MEDICINE

## 2024-07-10 PROCEDURE — 3430000001 HC RX 343 DIAGNOSTIC RADIOPHARMACEUTICALS: Performed by: INTERNAL MEDICINE

## 2024-07-10 PROCEDURE — 2500000004 HC RX 250 GENERAL PHARMACY W/ HCPCS (ALT 636 FOR OP/ED): Performed by: INTERNAL MEDICINE

## 2024-07-10 PROCEDURE — 78452 HT MUSCLE IMAGE SPECT MULT: CPT

## 2024-07-10 RX ORDER — REGADENOSON 0.08 MG/ML
0.4 INJECTION, SOLUTION INTRAVENOUS ONCE
Status: COMPLETED | OUTPATIENT
Start: 2024-07-10 | End: 2024-07-10

## 2024-07-11 ENCOUNTER — HOSPITAL ENCOUNTER (OUTPATIENT)
Dept: RADIOLOGY | Facility: CLINIC | Age: 59
Discharge: HOME | End: 2024-07-11
Payer: COMMERCIAL

## 2024-07-11 PROCEDURE — A9502 TC99M TETROFOSMIN: HCPCS | Performed by: INTERNAL MEDICINE

## 2024-07-11 PROCEDURE — 3430000001 HC RX 343 DIAGNOSTIC RADIOPHARMACEUTICALS: Performed by: INTERNAL MEDICINE

## 2024-07-18 ENCOUNTER — APPOINTMENT (OUTPATIENT)
Dept: CARDIOLOGY | Facility: CLINIC | Age: 59
End: 2024-07-18
Payer: COMMERCIAL

## 2024-07-18 VITALS
HEART RATE: 72 BPM | BODY MASS INDEX: 47.09 KG/M2 | HEIGHT: 66 IN | TEMPERATURE: 97.6 F | DIASTOLIC BLOOD PRESSURE: 86 MMHG | WEIGHT: 293 LBS | SYSTOLIC BLOOD PRESSURE: 136 MMHG

## 2024-07-18 DIAGNOSIS — R94.39 ABNORMAL CARDIOVASCULAR STRESS TEST: Primary | ICD-10-CM

## 2024-07-18 DIAGNOSIS — E78.49 OTHER HYPERLIPIDEMIA: ICD-10-CM

## 2024-07-18 DIAGNOSIS — I10 PRIMARY HYPERTENSION: ICD-10-CM

## 2024-07-18 PROCEDURE — 3075F SYST BP GE 130 - 139MM HG: CPT | Performed by: INTERNAL MEDICINE

## 2024-07-18 PROCEDURE — 3008F BODY MASS INDEX DOCD: CPT | Performed by: INTERNAL MEDICINE

## 2024-07-18 PROCEDURE — 4010F ACE/ARB THERAPY RXD/TAKEN: CPT | Performed by: INTERNAL MEDICINE

## 2024-07-18 PROCEDURE — 3061F NEG MICROALBUMINURIA REV: CPT | Performed by: INTERNAL MEDICINE

## 2024-07-18 PROCEDURE — 3079F DIAST BP 80-89 MM HG: CPT | Performed by: INTERNAL MEDICINE

## 2024-07-18 PROCEDURE — 3048F LDL-C <100 MG/DL: CPT | Performed by: INTERNAL MEDICINE

## 2024-07-18 PROCEDURE — 1036F TOBACCO NON-USER: CPT | Performed by: INTERNAL MEDICINE

## 2024-07-18 PROCEDURE — 99213 OFFICE O/P EST LOW 20 MIN: CPT | Performed by: INTERNAL MEDICINE

## 2024-07-18 ASSESSMENT — PAIN SCALES - GENERAL: PAINLEVEL: 6

## 2024-07-18 NOTE — PROGRESS NOTES
1.  Hypertension  2.  Hyperlipidemia  3.  Diabetes mellitus  4.  Hypothyroidism  5.  Asthma  6.  Obesity, BMI 58        Patient is a pleasant 59-year-old female with the above-noted pertinent past medical history who presents today for follow-up of her stress test.  She had presented to my office as part preoperative cardiovascular evaluation, she stated that she had was contemplating bariatric surgery she also had complaints of exertional shortness of breath difficulty with walking her level of exertion was further compromised by her chronic back pain, she completed the stress test and returns today for follow-up.  She has no new complaints, she appears to have second thoughts about bariatric surgery and wishing to continue with dieting at this time.    BMI 58, blood pressure 136/86 mmHg and heart rate of 72 bpm  Patient is a well-developed well-nourished female in no acute distress speaking full sentences no carotid bruits, heart rate and rhythm are regular S1-S2 are normal no gallop or murmurs, lungs decreased breath sound but clear, abdomen is obese positive bowel sounds soft and nontender    Lexiscan nuclear 6/2024 showed possible mild to moderate anterior wall ischemia versus breast attenuation no evidence of myocardial infarction LVEF 79%    59-year-old female with multiple coronary artery disease risk factors including hypertension hyperlipidemia diabetes mellitus and limited level of activity exertional shortness of breath who as possible anterior wall ischemia versus breast attenuation artifact, number of diagnostic test including coronary calcium PET scan and left heart catheterization was discussed risk-benefit and options were reviewed patient wishing to proceed with a left heart catheterization, preprocedure laboratory test were ordered, patient is to return to my clinic after above testing is completed.

## 2024-07-23 ENCOUNTER — APPOINTMENT (OUTPATIENT)
Dept: PHARMACY | Facility: HOSPITAL | Age: 59
End: 2024-07-23
Payer: COMMERCIAL

## 2024-07-23 DIAGNOSIS — E11.9 TYPE 2 DIABETES MELLITUS WITHOUT COMPLICATION, WITHOUT LONG-TERM CURRENT USE OF INSULIN (MULTI): Primary | ICD-10-CM

## 2024-07-23 ASSESSMENT — ENCOUNTER SYMPTOMS: DIABETIC ASSOCIATED SYMPTOMS: 0

## 2024-07-23 NOTE — PROGRESS NOTES
Patient ID: Alicia Cotto is a 59 y.o. female who presents for Diabetes.    Referring Provider: Jenny Mcknight APRN-*  PCP: LUIS E Nelson-CNP Last visit with PCP: 4/3/24 Next visit with PCP: 10/3/24       Subjective   Treatment Adherence:   Patient did take medications today.   Number of missed doses in last 7 days: 0.       Preferred pharmacy: [facility]  Can patient afford prescribed medications: Yes, Patient has no complaints    Diabetes  She presents for her follow-up diabetic visit. She has type 2 diabetes mellitus. Her disease course has been stable. There are no hypoglycemic associated symptoms. There are no diabetic associated symptoms. Current diabetic treatment includes oral agent (monotherapy).     DISCUSSION  Patient ran out of pioglitazone 2 weeks ago  FB, 110 (while only taking Jardiance 25 mg)  Patient denies any readings >130  Denies signs of hypoglycemia   Patient has been working on portion control  Advised to follow Plate Method for recommended portion sizes of vegetables, proteins, and carbohydrates   Patient has no further questions or concerns   Objective     There were no vitals taken for this visit.   BP Readings from Last 4 Encounters:   24 136/86   24 122/80   24 128/70   23 134/72      There were no vitals filed for this visit.     Labs  Lab Results   Component Value Date    BILITOT 1.2 2024    CALCIUM 9.4 2024    CO2 22 2024     2024    CREATININE 1.04 2024    GLUCOSE 153 (H) 2024    ALKPHOS 58 2024    K 4.3 2024    PROT 6.8 2024     2024    AST 23 2024    ALT 23 2024    BUN 17 2024    ANIONGAP 18 2024    ALBUMIN 4.0 2024    AMYLASE 21 (L) 01/15/2018    LIPASE 15 01/15/2018    GFRF 41 (A) 2023     Lab Results   Component Value Date    TRIG 163 (H) 2024    CHOL 145 2024    LDLCALC 80 2024    HDL 32.1 2024     Lab  Results   Component Value Date    HGBA1C 6.7 (A) 03/20/2024       Current Outpatient Medications   Medication Instructions    albuterol 90 mcg/actuation inhaler 2 puffs, inhalation, Every 4 hours PRN, Every 4-6 hours as needed.    clobetasol (Olux) 0.05 % topical foam Topical, 2 times daily    cyclobenzaprine (FLEXERIL) 5 mg, oral, 3 times daily PRN    diclofenac sodium (Voltaren) 1 % gel gel Apply 4 grams to affected knee 3-4 x daily    empagliflozin (JARDIANCE) 25 mg, oral, Daily    ergocalciferol (VITAMIN D-2) 50,000 Units, oral, Once Weekly    FreeStyle lancets 28 gauge Use as instructed three times weekly    FreeStyle Lite Meter kit Use to check blood glucose 3 times weekly    FreeStyle Lite Strips strip Use to check blood glucose 3 times weekly    hydroCHLOROthiazide (HYDRODIURIL) 25 mg, oral, Daily PRN    levothyroxine (SYNTHROID, LEVOXYL) 112 mcg, oral, Daily    lisinopril 40 mg, oral, Daily    montelukast (SINGULAIR) 10 mg, oral, Daily    pioglitazone (ACTOS) 30 mg, oral, Daily    simvastatin (ZOCOR) 40 mg, oral, Every evening      Assessment/Plan   Diabetes is controlled with A1c of 6.7% ( goal <7%)  CONTINUE Jardiance 25 mg once daily  DISCONTINUE pioglitazone due to fasting blood glucose readings in range        Follow-up: 8/20 to assess blood glucose control      Time spent with pt: Total length of time 15 (minutes) of the encounter and more than 50% was spent counseling the patient.    Rosana Shannon, PharmD    Continue all meds under the continuation of care with the referring provider and clinical pharmacy team.

## 2024-08-02 ENCOUNTER — LAB (OUTPATIENT)
Dept: LAB | Facility: LAB | Age: 59
End: 2024-08-02
Payer: COMMERCIAL

## 2024-08-02 DIAGNOSIS — R94.39 ABNORMAL CARDIOVASCULAR STRESS TEST: ICD-10-CM

## 2024-08-02 DIAGNOSIS — I10 PRIMARY HYPERTENSION: ICD-10-CM

## 2024-08-02 DIAGNOSIS — E78.49 OTHER HYPERLIPIDEMIA: ICD-10-CM

## 2024-08-02 LAB
ANION GAP SERPL CALC-SCNC: 14 MMOL/L (ref 10–20)
BUN SERPL-MCNC: 47 MG/DL (ref 6–23)
CALCIUM SERPL-MCNC: 9.3 MG/DL (ref 8.6–10.3)
CHLORIDE SERPL-SCNC: 109 MMOL/L (ref 98–107)
CO2 SERPL-SCNC: 18 MMOL/L (ref 21–32)
CREAT SERPL-MCNC: 1.35 MG/DL (ref 0.5–1.05)
EGFRCR SERPLBLD CKD-EPI 2021: 46 ML/MIN/1.73M*2
ERYTHROCYTE [DISTWIDTH] IN BLOOD BY AUTOMATED COUNT: 12.7 % (ref 11.5–14.5)
GLUCOSE SERPL-MCNC: 160 MG/DL (ref 74–99)
HCT VFR BLD AUTO: 40.9 % (ref 36–46)
HGB BLD-MCNC: 13 G/DL (ref 12–16)
INR PPP: 1 (ref 0.9–1.1)
MCH RBC QN AUTO: 30.2 PG (ref 26–34)
MCHC RBC AUTO-ENTMCNC: 31.8 G/DL (ref 32–36)
MCV RBC AUTO: 95 FL (ref 80–100)
NRBC BLD-RTO: 0 /100 WBCS (ref 0–0)
PLATELET # BLD AUTO: 208 X10*3/UL (ref 150–450)
POTASSIUM SERPL-SCNC: 5.1 MMOL/L (ref 3.5–5.3)
PROTHROMBIN TIME: 10.7 SECONDS (ref 9.8–12.8)
RBC # BLD AUTO: 4.3 X10*6/UL (ref 4–5.2)
SODIUM SERPL-SCNC: 136 MMOL/L (ref 136–145)
WBC # BLD AUTO: 6 X10*3/UL (ref 4.4–11.3)

## 2024-08-02 PROCEDURE — 36415 COLL VENOUS BLD VENIPUNCTURE: CPT

## 2024-08-02 PROCEDURE — 85610 PROTHROMBIN TIME: CPT

## 2024-08-02 PROCEDURE — 85027 COMPLETE CBC AUTOMATED: CPT

## 2024-08-02 PROCEDURE — 80048 BASIC METABOLIC PNL TOTAL CA: CPT

## 2024-08-06 ENCOUNTER — TELEPHONE (OUTPATIENT)
Dept: CARDIOLOGY | Facility: CLINIC | Age: 59
End: 2024-08-06
Payer: COMMERCIAL

## 2024-08-06 RX ORDER — METOPROLOL TARTRATE 25 MG/1
25 TABLET, FILM COATED ORAL ONCE
Status: CANCELLED | OUTPATIENT
Start: 2024-08-06 | End: 2024-08-06

## 2024-08-06 NOTE — TELEPHONE ENCOUNTER
----- Message from Darin Pate sent at 8/5/2024 11:05 AM EDT -----  Your recent blood test showed decreased in kidney function, make sure you are well-hydrated and follow-up with your PCP.

## 2024-08-16 ENCOUNTER — HOSPITAL ENCOUNTER (OUTPATIENT)
Facility: HOSPITAL | Age: 59
Setting detail: OUTPATIENT SURGERY
Discharge: HOME | End: 2024-08-16
Attending: INTERNAL MEDICINE | Admitting: INTERNAL MEDICINE
Payer: COMMERCIAL

## 2024-08-16 ENCOUNTER — APPOINTMENT (OUTPATIENT)
Dept: CARDIOLOGY | Facility: HOSPITAL | Age: 59
End: 2024-08-16
Payer: COMMERCIAL

## 2024-08-16 VITALS
DIASTOLIC BLOOD PRESSURE: 62 MMHG | BODY MASS INDEX: 58.59 KG/M2 | RESPIRATION RATE: 20 BRPM | OXYGEN SATURATION: 95 % | HEIGHT: 66 IN | SYSTOLIC BLOOD PRESSURE: 132 MMHG | HEART RATE: 60 BPM | TEMPERATURE: 97.9 F

## 2024-08-16 DIAGNOSIS — R94.39 ABNORMAL CARDIOVASCULAR STRESS TEST: Primary | ICD-10-CM

## 2024-08-16 DIAGNOSIS — I20.9 ANGINA PECTORIS, UNSPECIFIED (CMS-HCC): ICD-10-CM

## 2024-08-16 DIAGNOSIS — I10 PRIMARY HYPERTENSION: ICD-10-CM

## 2024-08-16 DIAGNOSIS — E78.49 OTHER HYPERLIPIDEMIA: ICD-10-CM

## 2024-08-16 LAB
ANION GAP SERPL CALC-SCNC: 13 MMOL/L (ref 10–20)
BUN SERPL-MCNC: 26 MG/DL (ref 6–23)
CALCIUM SERPL-MCNC: 9.1 MG/DL (ref 8.6–10.3)
CHLORIDE SERPL-SCNC: 106 MMOL/L (ref 98–107)
CO2 SERPL-SCNC: 24 MMOL/L (ref 21–32)
CREAT SERPL-MCNC: 1.13 MG/DL (ref 0.5–1.05)
EGFRCR SERPLBLD CKD-EPI 2021: 57 ML/MIN/1.73M*2
GLUCOSE SERPL-MCNC: 187 MG/DL (ref 74–99)
POTASSIUM SERPL-SCNC: 4.5 MMOL/L (ref 3.5–5.3)
SODIUM SERPL-SCNC: 138 MMOL/L (ref 136–145)

## 2024-08-16 PROCEDURE — 7100000009 HC PHASE TWO TIME - INITIAL BASE CHARGE: Performed by: INTERNAL MEDICINE

## 2024-08-16 PROCEDURE — C1894 INTRO/SHEATH, NON-LASER: HCPCS | Performed by: INTERNAL MEDICINE

## 2024-08-16 PROCEDURE — 7100000010 HC PHASE TWO TIME - EACH INCREMENTAL 1 MINUTE: Performed by: INTERNAL MEDICINE

## 2024-08-16 PROCEDURE — 2500000001 HC RX 250 WO HCPCS SELF ADMINISTERED DRUGS (ALT 637 FOR MEDICARE OP): Performed by: NURSE PRACTITIONER

## 2024-08-16 PROCEDURE — 93458 L HRT ARTERY/VENTRICLE ANGIO: CPT | Mod: 59 | Performed by: INTERNAL MEDICINE

## 2024-08-16 PROCEDURE — 93010 ELECTROCARDIOGRAM REPORT: CPT | Performed by: INTERNAL MEDICINE

## 2024-08-16 PROCEDURE — 93005 ELECTROCARDIOGRAM TRACING: CPT

## 2024-08-16 PROCEDURE — 99024 POSTOP FOLLOW-UP VISIT: CPT | Performed by: NURSE PRACTITIONER

## 2024-08-16 PROCEDURE — C1887 CATHETER, GUIDING: HCPCS | Performed by: INTERNAL MEDICINE

## 2024-08-16 PROCEDURE — 99152 MOD SED SAME PHYS/QHP 5/>YRS: CPT | Performed by: INTERNAL MEDICINE

## 2024-08-16 PROCEDURE — 2500000004 HC RX 250 GENERAL PHARMACY W/ HCPCS (ALT 636 FOR OP/ED): Performed by: NURSE PRACTITIONER

## 2024-08-16 PROCEDURE — 82374 ASSAY BLOOD CARBON DIOXIDE: CPT | Performed by: NURSE PRACTITIONER

## 2024-08-16 PROCEDURE — 36415 COLL VENOUS BLD VENIPUNCTURE: CPT | Performed by: NURSE PRACTITIONER

## 2024-08-16 PROCEDURE — 2500000004 HC RX 250 GENERAL PHARMACY W/ HCPCS (ALT 636 FOR OP/ED): Performed by: INTERNAL MEDICINE

## 2024-08-16 PROCEDURE — 96373 THER/PROPH/DIAG INJ IA: CPT | Mod: 59 | Performed by: INTERNAL MEDICINE

## 2024-08-16 PROCEDURE — 93458 L HRT ARTERY/VENTRICLE ANGIO: CPT | Performed by: INTERNAL MEDICINE

## 2024-08-16 PROCEDURE — 2720000007 HC OR 272 NO HCPCS: Performed by: INTERNAL MEDICINE

## 2024-08-16 PROCEDURE — 2500000005 HC RX 250 GENERAL PHARMACY W/O HCPCS: Performed by: INTERNAL MEDICINE

## 2024-08-16 PROCEDURE — 2550000001 HC RX 255 CONTRASTS: Performed by: INTERNAL MEDICINE

## 2024-08-16 RX ORDER — NITROGLYCERIN 40 MG/100ML
INJECTION INTRAVENOUS AS NEEDED
Status: DISCONTINUED | OUTPATIENT
Start: 2024-08-16 | End: 2024-08-16 | Stop reason: HOSPADM

## 2024-08-16 RX ORDER — MIDAZOLAM HYDROCHLORIDE 1 MG/ML
INJECTION INTRAMUSCULAR; INTRAVENOUS AS NEEDED
Status: DISCONTINUED | OUTPATIENT
Start: 2024-08-16 | End: 2024-08-16 | Stop reason: HOSPADM

## 2024-08-16 RX ORDER — ASPIRIN 325 MG
325 TABLET ORAL ONCE
Status: DISCONTINUED | OUTPATIENT
Start: 2024-08-16 | End: 2024-08-16 | Stop reason: HOSPADM

## 2024-08-16 RX ORDER — RANOLAZINE 500 MG/1
500 TABLET, EXTENDED RELEASE ORAL ONCE
Status: COMPLETED | OUTPATIENT
Start: 2024-08-16 | End: 2024-08-16

## 2024-08-16 RX ORDER — HEPARIN SODIUM 1000 [USP'U]/ML
INJECTION, SOLUTION INTRAVENOUS; SUBCUTANEOUS AS NEEDED
Status: DISCONTINUED | OUTPATIENT
Start: 2024-08-16 | End: 2024-08-16 | Stop reason: HOSPADM

## 2024-08-16 RX ORDER — NICARDIPINE HYDROCHLORIDE 2.5 MG/ML
INJECTION INTRAVENOUS AS NEEDED
Status: DISCONTINUED | OUTPATIENT
Start: 2024-08-16 | End: 2024-08-16 | Stop reason: HOSPADM

## 2024-08-16 RX ORDER — ASPIRIN 325 MG
325 TABLET ORAL DAILY
Status: DISCONTINUED | OUTPATIENT
Start: 2024-08-16 | End: 2024-08-16

## 2024-08-16 RX ORDER — ONDANSETRON HYDROCHLORIDE 2 MG/ML
INJECTION, SOLUTION INTRAVENOUS AS NEEDED
Status: DISCONTINUED | OUTPATIENT
Start: 2024-08-16 | End: 2024-08-16 | Stop reason: HOSPADM

## 2024-08-16 RX ORDER — SODIUM CHLORIDE 9 MG/ML
100 INJECTION, SOLUTION INTRAVENOUS CONTINUOUS
Status: DISCONTINUED | OUTPATIENT
Start: 2024-08-16 | End: 2024-08-16 | Stop reason: HOSPADM

## 2024-08-16 RX ORDER — LIDOCAINE HYDROCHLORIDE 20 MG/ML
INJECTION, SOLUTION INFILTRATION; PERINEURAL AS NEEDED
Status: DISCONTINUED | OUTPATIENT
Start: 2024-08-16 | End: 2024-08-16 | Stop reason: HOSPADM

## 2024-08-16 RX ORDER — SODIUM CHLORIDE 9 MG/ML
100 INJECTION, SOLUTION INTRAVENOUS CONTINUOUS
Status: DISCONTINUED | OUTPATIENT
Start: 2024-08-16 | End: 2024-08-16

## 2024-08-16 RX ORDER — FENTANYL CITRATE 50 UG/ML
INJECTION, SOLUTION INTRAMUSCULAR; INTRAVENOUS AS NEEDED
Status: DISCONTINUED | OUTPATIENT
Start: 2024-08-16 | End: 2024-08-16 | Stop reason: HOSPADM

## 2024-08-16 RX ORDER — METOPROLOL TARTRATE 25 MG/1
12.5 TABLET, FILM COATED ORAL ONCE
Status: COMPLETED | OUTPATIENT
Start: 2024-08-16 | End: 2024-08-16

## 2024-08-16 ASSESSMENT — PAIN - FUNCTIONAL ASSESSMENT
PAIN_FUNCTIONAL_ASSESSMENT: 0-10

## 2024-08-16 ASSESSMENT — COLUMBIA-SUICIDE SEVERITY RATING SCALE - C-SSRS
2. HAVE YOU ACTUALLY HAD ANY THOUGHTS OF KILLING YOURSELF?: NO
6. HAVE YOU EVER DONE ANYTHING, STARTED TO DO ANYTHING, OR PREPARED TO DO ANYTHING TO END YOUR LIFE?: NO
1. IN THE PAST MONTH, HAVE YOU WISHED YOU WERE DEAD OR WISHED YOU COULD GO TO SLEEP AND NOT WAKE UP?: NO

## 2024-08-16 ASSESSMENT — PAIN SCALES - GENERAL
PAINLEVEL_OUTOF10: 0 - NO PAIN

## 2024-08-16 NOTE — POST-PROCEDURE NOTE
Physician Transition of Care Summary  Invasive Cardiovascular Lab    Procedure Date: 8/16/2024  Attending:    * Christa Bhatia - Primary  Resident/Fellow/Other Assistant: Surgeons and Role:  * No surgeons found with a matching role *    Indications:   Pre-op Diagnosis      * Abnormal cardiovascular stress test [R94.39]     * Primary hypertension [I10]     * Other hyperlipidemia [E78.49]     * BMI 50.0-59.9, adult (Multi) [Z68.43]    Post-procedure diagnosis:   Post-op Diagnosis     * Abnormal cardiovascular stress test [R94.39]     * Primary hypertension [I10]     * Other hyperlipidemia [E78.49]     * BMI 50.0-59.9, adult (Multi) [Z68.43]    Procedure(s):   Left Heart Cath  35203 - MS CATH PLMT L HRT & ARTS W/NJX & ANGIO IMG S&I        Procedure Findings:   Normal coronaries, normal left ventricular function    Description of the Procedure:   Left heart catheterization coronary angiography left ventricular    Complications:   None    Stents/Implants:   Implants       No implant documentation for this case.            Anticoagulation/Antiplatelet Plan:   Intravenous heparin    Estimated Blood Loss:   * No values recorded between 8/16/2024 11:36 AM and 8/16/2024 12:12 PM *    Anesthesia: Moderate Sedation Anesthesia Staff: No anesthesia staff entered.    Any Specimen(s) Removed:   Order Name Source Comment Collection Info Order Time   BASIC METABOLIC PANEL Blood, Venous  Collected By: Donita Reardon RN 8/16/2024  8:59 AM     Release result to [email or web address]   Immediate            Disposition:   Medical therapy      Electronically signed by: Christa Bhatia MD, 8/16/2024 12:12 PM

## 2024-08-16 NOTE — H&P
"History Of Present Illness:    Alicia Cotto is a 59 y.o. female presenting with recurrent episode of chest pain.  Status post history of hyperlipidemia and history of hypertension..     Last Recorded Vitals:  Vitals:    08/16/24 0922   BP: 158/81   Pulse: 85   Resp: 16   Temp: 36.6 °C (97.9 °F)   TempSrc: Temporal   SpO2: 99%   Height: 1.676 m (5' 6\")       Last Labs:  CBC - 8/2/2024: 10:25 AM  6.0 13.0 208    40.9      CMP - 8/16/2024:  9:42 AM  9.1 6.8 23 --- 1.2   _ 4.0 23 58      PTT - No results in last year.  1.0   10.7 _     POC HEMOGLOBIN A1c   Date/Time Value Ref Range Status   03/20/2024 04:36 PM 6.7 (A) 4.2 - 6.5 % Final   09/27/2023 11:18 AM 6.1 4.2 - 6.5 % Final     Hemoglobin A1C   Date/Time Value Ref Range Status   11/21/2023 08:40 AM 6.9 (H) see below % Final     LDL Calculated   Date/Time Value Ref Range Status   03/26/2024 08:21 AM 80 <=99 mg/dL Final     Comment:                                 Near   Borderline      AGE      Desirable  Optimal    High     High     Very High     0-19 Y     0 - 109     ---    110-129   >/= 130     ----    20-24 Y     0 - 119     ---    120-159   >/= 160     ----      >24 Y     0 -  99   100-129  130-159   160-189     >/=190     11/21/2023 08:40 AM 79 <=99 mg/dL Final     Comment:                                 Near   Borderline      AGE      Desirable  Optimal    High     High     Very High     0-19 Y     0 - 109     ---    110-129   >/= 130     ----    20-24 Y     0 - 119     ---    120-159   >/= 160     ----      >24 Y     0 -  99   100-129  130-159   160-189     >/=190       VLDL   Date/Time Value Ref Range Status   03/26/2024 08:21 AM 33 0 - 40 mg/dL Final   11/21/2023 08:40 AM 39 0 - 40 mg/dL Final   03/30/2023 09:14 AM 31 0 - 40 mg/dL Final   07/27/2022 08:29 AM 36 0 - 40 mg/dL Final   10/12/2021 09:59 AM 32 0 - 40 mg/dL Final      Last I/O:  No intake/output data recorded.    Past Cardiology Tests (Last 3 Years):  EKG:  ECG 12 lead 08/16/2024 " "(Preliminary)      ECG 12 Lead 2023    Echo:  No results found for this or any previous visit from the past 1095 days.    Ejection Fractions:  No results found for: \"EF\"  Cath:  No results found for this or any previous visit from the past 1095 days.    Stress Test:  Nuclear Stress Test 2024    Cardiac Imaging:  No results found for this or any previous visit from the past 1095 days.      Past Medical History:  She has a past medical history of Acute upper respiratory infection, unspecified (2015), Allergic, Anxiety, Arthritis, Contusion of left shoulder, initial encounter (2017), COVID-19 virus infection (2023), Depression, Diabetes mellitus (Multi), Disease of thyroid gland, Eczema, Encounter for gynecological examination (general) (routine) without abnormal findings, Hyperlipidemia, Hypertension, Major depressive disorder with single episode (2023), Pain in left shoulder (2017), Personal history of other diseases of the musculoskeletal system and connective tissue (2017), Personal history of other diseases of the musculoskeletal system and connective tissue (2017), Personal history of other diseases of the respiratory system, Personal history of other endocrine, nutritional and metabolic disease (2015), Personal history of other endocrine, nutritional and metabolic disease, Personal history of other endocrine, nutritional and metabolic disease, Personal history of other medical treatment, Personal history of other specified conditions (2015), Unspecified asthma with (acute) exacerbation ([facility]) (2020), and Unspecified fall, initial encounter (2017).    Past Surgical History:  She has a past surgical history that includes  section, classic (2014); Back surgery (2014); Other surgical history (2019); Other surgical history (2022); Hysterectomy (2019); Tonsillectomy (2014);  section, low " transverse (1991); and Eye surgery (1/10/21).      Social History:  She reports that she has never smoked. She has never been exposed to tobacco smoke. She has never used smokeless tobacco. She reports that she does not drink alcohol and does not use drugs.    Family History:  Family History   Problem Relation Name Age of Onset    Arthritis Mother Tammi     Diabetes Father Bruno     Hypertension Father Bruno     Cancer Father Bruno     Breast cancer Sister  49    Hearing loss Daughter Randee     Depression Daughter Randee     Diabetes Sibling      Hypertension Sibling      Cancer Sibling          Allergies:  Ibuprofen, Naproxen, and Nsaids (non-steroidal anti-inflammatory drug)    Inpatient Medications:  Scheduled medications   Medication Dose Route Frequency    aspirin  325 mg oral Once     PRN medications   Medication     Continuous Medications   Medication Dose Last Rate    sodium chloride 0.9%  100 mL/hr 100 mL/hr (08/16/24 0919)     Outpatient Medications:  Current Outpatient Medications   Medication Instructions    albuterol 90 mcg/actuation inhaler 2 puffs, inhalation, Every 4 hours PRN, Every 4-6 hours as needed.    clobetasol (Olux) 0.05 % topical foam Topical, 2 times daily    cyclobenzaprine (FLEXERIL) 5 mg, oral, 3 times daily PRN    diclofenac sodium (Voltaren) 1 % gel gel Apply 4 grams to affected knee 3-4 x daily    empagliflozin (JARDIANCE) 25 mg, oral, Daily    ergocalciferol (VITAMIN D-2) 50,000 Units, oral, Once Weekly    FreeStyle lancets 28 gauge Use as instructed three times weekly    FreeStyle Lite Meter kit Use to check blood glucose 3 times weekly    FreeStyle Lite Strips strip Use to check blood glucose 3 times weekly    hydroCHLOROthiazide (HYDRODIURIL) 25 mg, oral, Daily PRN    levothyroxine (SYNTHROID, LEVOXYL) 112 mcg, oral, Daily    lisinopril 40 mg, oral, Daily    montelukast (SINGULAIR) 10 mg, oral, Daily    pioglitazone (ACTOS) 30 mg, oral, Daily    simvastatin (ZOCOR) 40 mg,  oral, Every evening       Physical Exam:  Alert oriented no distress.  Lungs were clear to auscultation no rales or rhonchi, skin was dry  Heart showed normal for second sounds no gallops  Abdomen is soft and benign  Extremities showed no pitting edema      Assessment/Plan   Recurrent chest pain consistent with angina  Plan cardiac catheterization       Code Status:  Full Code            Christa Bhatia MD

## 2024-08-16 NOTE — PROGRESS NOTES
Patient is stable status post C in the care of Dr. Bhatia.  Discussed results of procedure with patient.  Pictures provided.  Findings of the LHC revealed normal coronary arteries and a normal LVEF.  Medical management is advised.  Patient will be scheduled to follow-up with Dr. Pate in 2 to 3 weeks.  Postprocedural activity, restrictions, potential complications, medications and future follow-up discussed at length.  All questions answered.  Patient verbalized understanding.

## 2024-08-16 NOTE — Clinical Note
Closure device placed in the right radial artery. Site closed by radial compression system. With 12cc air

## 2024-08-16 NOTE — NURSING NOTE
Discharge instructions reviewed with patient. Discussed in depth post procedure restrictions, pending follow up appointment and medications. Questions and concerns addressed. Right radial site remains stable and unchanged. Plan to dc home.

## 2024-08-16 NOTE — NURSING NOTE
Patient arrived from cath lab, s/p [facility]. Right radial vascband in place with 12 cc of air with radial pulses palpable. Patient A&Ox4 and has no c/o at this time. Will continue to monitor.

## 2024-08-16 NOTE — DISCHARGE INSTRUCTIONS

## 2024-08-18 LAB
ATRIAL RATE: 77 BPM
P AXIS: 38 DEGREES
P OFFSET: 185 MS
P ONSET: 124 MS
PR INTERVAL: 186 MS
Q ONSET: 217 MS
QRS COUNT: 13 BEATS
QRS DURATION: 84 MS
QT INTERVAL: 392 MS
QTC CALCULATION(BAZETT): 443 MS
QTC FREDERICIA: 425 MS
R AXIS: 30 DEGREES
T AXIS: 51 DEGREES
T OFFSET: 413 MS
VENTRICULAR RATE: 77 BPM

## 2024-08-19 ENCOUNTER — APPOINTMENT (OUTPATIENT)
Dept: PRIMARY CARE | Facility: CLINIC | Age: 59
End: 2024-08-19
Payer: COMMERCIAL

## 2024-08-19 DIAGNOSIS — I10 PRIMARY HYPERTENSION: ICD-10-CM

## 2024-08-19 DIAGNOSIS — G89.29 CHRONIC BILATERAL LOW BACK PAIN WITHOUT SCIATICA: Primary | ICD-10-CM

## 2024-08-19 DIAGNOSIS — N18.32 STAGE 3B CHRONIC KIDNEY DISEASE (CKD) (MULTI): ICD-10-CM

## 2024-08-19 DIAGNOSIS — M54.50 CHRONIC BILATERAL LOW BACK PAIN WITHOUT SCIATICA: Primary | ICD-10-CM

## 2024-08-19 PROCEDURE — 3048F LDL-C <100 MG/DL: CPT | Performed by: NURSE PRACTITIONER

## 2024-08-19 PROCEDURE — 99213 OFFICE O/P EST LOW 20 MIN: CPT | Performed by: NURSE PRACTITIONER

## 2024-08-19 PROCEDURE — 3061F NEG MICROALBUMINURIA REV: CPT | Performed by: NURSE PRACTITIONER

## 2024-08-19 PROCEDURE — 1036F TOBACCO NON-USER: CPT | Performed by: NURSE PRACTITIONER

## 2024-08-19 PROCEDURE — 4010F ACE/ARB THERAPY RXD/TAKEN: CPT | Performed by: NURSE PRACTITIONER

## 2024-08-19 NOTE — SIGNIFICANT EVENT
Called and spoke with Pt; no complications noted, rates nursing care 10/10 and said everyone was outstanding.

## 2024-08-19 NOTE — PROGRESS NOTES
Subjective   Patient ID: Alicia Cotto is a 59 y.o. female who presents for discuss specialist appointment / medications.    Virtual or Telephone Consent    An interactive audio and video telecommunication system which permits real time communications between the patient (at the originating site) and provider (at the distant site) was utilized to provide this telehealth service.   Verbal consent was requested and obtained from Alicia Cotto on this date, 08/19/24 for a telehealth visit.       HPI     Pt recently underwent a cardiac cath by Dr Christa Bhatia.   No intervention required.   During visit it was noted that her CR level had increased from prior.   She was told to speak with PCP    Repeat lab did show a decrease in CR and increase in GFR    DM 2: Pharm D  Med: Jardiance    HTN:  Med: Lisinopril    Generalized edema:  Med: HCTZ she has stopped taking this about 2 weeks ago     Morbid obesity:   Frustrated because she is trying to be active but her lower back  and legs begin to hurt when walking.   She notices weakness in legs.   No numbness or tingling  Feels it is really interfering in her activity    Past Medical History:   Diagnosis Date    Acute upper respiratory infection, unspecified 09/29/2015    Viral upper respiratory tract infection with cough    Allergic     Anxiety     Arthritis     Contusion of left shoulder, initial encounter 06/23/2017    Contusion of left shoulder, initial encounter    COVID-19 virus infection 02/24/2023    We discussed her overall lingering symptoms and the other possible causes for her symptoms.  Continue to monitor. She can contact the office if she would like to be referred to the long-haul COVID clinic.    Depression     Diabetes mellitus (Multi)     Disease of thyroid gland     Eczema     Encounter for gynecological examination (general) (routine) without abnormal findings     Periodic health assessment, Pap and pelvic    Hyperlipidemia     Hypertension     Major  depressive disorder with single episode 2023    Pain in left shoulder 2017    Left shoulder pain    Personal history of other diseases of the musculoskeletal system and connective tissue 2017    History of neck pain    Personal history of other diseases of the musculoskeletal system and connective tissue 2017    History of back pain    Personal history of other diseases of the respiratory system     Personal history of asthma    Personal history of other endocrine, nutritional and metabolic disease 2015    History of type 2 diabetes mellitus    Personal history of other endocrine, nutritional and metabolic disease     History of diabetes mellitus    Personal history of other endocrine, nutritional and metabolic disease     History of hyperlipidemia    Personal history of other medical treatment     H/O mammogram    Personal history of other specified conditions 2015    History of painful urination    Unspecified asthma with (acute) exacerbation ([facility]) 2020    Asthma exacerbation    Unspecified fall, initial encounter 2017    Fall, accidental     Past Surgical History:   Procedure Laterality Date    BACK SURGERY  2014    Back Surgery    CARDIAC CATHETERIZATION      2024     SECTION, CLASSIC  2014     Section     SECTION, LOW TRANSVERSE  1991    EYE SURGERY  1/10/21    HYSTERECTOMY  2019    Hysterectomy    OTHER SURGICAL HISTORY  2019    Meniscus repair    OTHER SURGICAL HISTORY  2022    Complete colonoscopy    TONSILLECTOMY  2014    Tonsillectomy       Review of Systems    Objective   There were no vitals taken for this visit.    Physical Exam    Alert and oriented x 3  Appears healthy  Does not appear/sound dyspneic with conversation  Speech clear.  Hearing adequate.  Psych: Normal affect. Good judgment and insight.       Assessment/Plan     1. Chronic bilateral low back pain without sciatica  - Referral  to Physical Medicine Rehab; Future    2. Stage 3b chronic kidney disease (CKD) (Multi)  CMP will be ordered for upcoming visit     3. Primary hypertension  Stable.  Continue current medications.    4. BMI 50.0-59.9, adult (Multi)  Continue to work with Pharm D    Follow up: 10/3/24 CPE     Medications refills will be completed as discussed.     Any labs or testing that is ordered will be reviewed and the results will be in your chart .   You can review these via  [employer].     Prescriptions will not be filled unless you are compliant with your follow-up appointments or have a follow-up appointment scheduled as per the instruction of your provider. Refills for medications should be requested at the time of your office visit.     Please allow one week for refill requests to be completed.     Contact office with any questions or concerns.   Preferred communication is via  [employer]      Call  Services: [phone] to assist with scheduling.      Jenny KIMBROUGH-[facility]  [address], [address]  [place] [address]  Phone: [phone]    **Charting was completed using voice recognition technology and may include unintended errors**

## 2024-08-20 ENCOUNTER — APPOINTMENT (OUTPATIENT)
Dept: PHARMACY | Facility: HOSPITAL | Age: 59
End: 2024-08-20
Payer: COMMERCIAL

## 2024-08-20 DIAGNOSIS — E11.9 TYPE 2 DIABETES MELLITUS WITHOUT COMPLICATION, UNSPECIFIED WHETHER LONG TERM INSULIN USE (MULTI): ICD-10-CM

## 2024-08-20 DIAGNOSIS — E11.9 TYPE 2 DIABETES MELLITUS WITHOUT COMPLICATION, WITHOUT LONG-TERM CURRENT USE OF INSULIN (MULTI): ICD-10-CM

## 2024-09-10 DIAGNOSIS — E11.9 TYPE 2 DIABETES MELLITUS WITHOUT COMPLICATION, UNSPECIFIED WHETHER LONG TERM INSULIN USE (MULTI): ICD-10-CM

## 2024-09-10 DIAGNOSIS — E11.9 TYPE 2 DIABETES MELLITUS WITHOUT COMPLICATION, WITHOUT LONG-TERM CURRENT USE OF INSULIN (MULTI): ICD-10-CM

## 2024-09-11 DIAGNOSIS — E11.9 TYPE 2 DIABETES MELLITUS WITHOUT COMPLICATION, UNSPECIFIED WHETHER LONG TERM INSULIN USE (MULTI): ICD-10-CM

## 2024-09-11 DIAGNOSIS — E11.9 TYPE 2 DIABETES MELLITUS WITHOUT COMPLICATION, WITHOUT LONG-TERM CURRENT USE OF INSULIN (MULTI): ICD-10-CM

## 2024-10-03 ENCOUNTER — APPOINTMENT (OUTPATIENT)
Dept: PRIMARY CARE | Facility: CLINIC | Age: 59
End: 2024-10-03
Payer: COMMERCIAL

## 2024-10-03 VITALS
BODY MASS INDEX: 47.09 KG/M2 | HEIGHT: 66 IN | SYSTOLIC BLOOD PRESSURE: 132 MMHG | OXYGEN SATURATION: 97 % | DIASTOLIC BLOOD PRESSURE: 70 MMHG | WEIGHT: 293 LBS | HEART RATE: 71 BPM

## 2024-10-03 DIAGNOSIS — E55.9 VITAMIN D DEFICIENCY: ICD-10-CM

## 2024-10-03 DIAGNOSIS — E11.9 TYPE 2 DIABETES MELLITUS WITHOUT COMPLICATION, WITHOUT LONG-TERM CURRENT USE OF INSULIN (MULTI): ICD-10-CM

## 2024-10-03 DIAGNOSIS — Z12.31 VISIT FOR SCREENING MAMMOGRAM: ICD-10-CM

## 2024-10-03 DIAGNOSIS — E78.5 HYPERLIPIDEMIA, UNSPECIFIED HYPERLIPIDEMIA TYPE: ICD-10-CM

## 2024-10-03 DIAGNOSIS — J30.89 ENVIRONMENTAL AND SEASONAL ALLERGIES: ICD-10-CM

## 2024-10-03 DIAGNOSIS — E03.9 HYPOTHYROIDISM, UNSPECIFIED TYPE: ICD-10-CM

## 2024-10-03 DIAGNOSIS — M54.50 CHRONIC BILATERAL LOW BACK PAIN WITHOUT SCIATICA: Primary | ICD-10-CM

## 2024-10-03 DIAGNOSIS — M19.90 ARTHRITIS: ICD-10-CM

## 2024-10-03 DIAGNOSIS — I10 HYPERTENSION, UNSPECIFIED TYPE: ICD-10-CM

## 2024-10-03 DIAGNOSIS — Z00.00 HEALTHCARE MAINTENANCE: ICD-10-CM

## 2024-10-03 DIAGNOSIS — E66.01 MORBID OBESITY WITH BMI OF 50.0-59.9, ADULT (MULTI): ICD-10-CM

## 2024-10-03 DIAGNOSIS — J45.998 SEASONAL ASTHMA (HHS-HCC): ICD-10-CM

## 2024-10-03 DIAGNOSIS — N18.32 STAGE 3B CHRONIC KIDNEY DISEASE (CKD) (MULTI): ICD-10-CM

## 2024-10-03 DIAGNOSIS — G89.29 CHRONIC BILATERAL LOW BACK PAIN WITHOUT SCIATICA: Primary | ICD-10-CM

## 2024-10-03 PROBLEM — Z86.39 HISTORY OF ELEVATED LIPIDS: Status: RESOLVED | Noted: 2024-04-03 | Resolved: 2024-10-03

## 2024-10-03 PROBLEM — Z86.39 HISTORY OF DIABETES MELLITUS: Status: RESOLVED | Noted: 2024-04-03 | Resolved: 2024-10-03

## 2024-10-03 PROBLEM — J06.9 UPPER RESPIRATORY TRACT INFECTION: Status: RESOLVED | Noted: 2024-04-03 | Resolved: 2024-10-03

## 2024-10-03 PROBLEM — E78.49 OTHER HYPERLIPIDEMIA: Status: RESOLVED | Noted: 2023-02-24 | Resolved: 2024-10-03

## 2024-10-03 PROCEDURE — 3048F LDL-C <100 MG/DL: CPT | Performed by: NURSE PRACTITIONER

## 2024-10-03 PROCEDURE — 4010F ACE/ARB THERAPY RXD/TAKEN: CPT | Performed by: NURSE PRACTITIONER

## 2024-10-03 PROCEDURE — 1036F TOBACCO NON-USER: CPT | Performed by: NURSE PRACTITIONER

## 2024-10-03 PROCEDURE — 3078F DIAST BP <80 MM HG: CPT | Performed by: NURSE PRACTITIONER

## 2024-10-03 PROCEDURE — 3061F NEG MICROALBUMINURIA REV: CPT | Performed by: NURSE PRACTITIONER

## 2024-10-03 PROCEDURE — 3075F SYST BP GE 130 - 139MM HG: CPT | Performed by: NURSE PRACTITIONER

## 2024-10-03 PROCEDURE — 99214 OFFICE O/P EST MOD 30 MIN: CPT | Performed by: NURSE PRACTITIONER

## 2024-10-03 PROCEDURE — 99396 PREV VISIT EST AGE 40-64: CPT | Performed by: NURSE PRACTITIONER

## 2024-10-03 PROCEDURE — 3008F BODY MASS INDEX DOCD: CPT | Performed by: NURSE PRACTITIONER

## 2024-10-03 RX ORDER — CYCLOBENZAPRINE HCL 5 MG
5 TABLET ORAL 3 TIMES DAILY PRN
Qty: 30 TABLET | Refills: 2 | Status: SHIPPED | OUTPATIENT
Start: 2024-10-03 | End: 2024-12-02

## 2024-10-03 RX ORDER — LEVOTHYROXINE SODIUM 112 UG/1
112 TABLET ORAL DAILY
Qty: 90 TABLET | Refills: 1 | Status: SHIPPED | OUTPATIENT
Start: 2024-10-03

## 2024-10-03 RX ORDER — MONTELUKAST SODIUM 10 MG/1
10 TABLET ORAL DAILY
Qty: 90 TABLET | Refills: 1 | Status: SHIPPED | OUTPATIENT
Start: 2024-10-03

## 2024-10-03 RX ORDER — SIMVASTATIN 40 MG/1
40 TABLET, FILM COATED ORAL EVERY EVENING
Qty: 90 TABLET | Refills: 1 | Status: SHIPPED | OUTPATIENT
Start: 2024-10-03

## 2024-10-03 RX ORDER — ERGOCALCIFEROL 1.25 MG/1
50000 CAPSULE ORAL
Qty: 13 CAPSULE | Refills: 3 | Status: SHIPPED | OUTPATIENT
Start: 2024-10-03 | End: 2025-10-03

## 2024-10-03 RX ORDER — LISINOPRIL 40 MG/1
40 TABLET ORAL DAILY
Qty: 90 TABLET | Refills: 1 | Status: SHIPPED | OUTPATIENT
Start: 2024-10-03

## 2024-10-03 ASSESSMENT — PROMIS GLOBAL HEALTH SCALE
RATE_QUALITY_OF_LIFE: GOOD
RATE_GENERAL_HEALTH: FAIR
CARRYOUT_PHYSICAL_ACTIVITIES: A LITTLE
RATE_AVERAGE_PAIN: 5
RATE_AVERAGE_FATIGUE: MODERATE
EMOTIONAL_PROBLEMS: SOMETIMES
CARRYOUT_SOCIAL_ACTIVITIES: FAIR
RATE_PHYSICAL_HEALTH: POOR
RATE_MENTAL_HEALTH: GOOD
RATE_SOCIAL_SATISFACTION: GOOD

## 2024-10-03 ASSESSMENT — PATIENT HEALTH QUESTIONNAIRE - PHQ9
2. FEELING DOWN, DEPRESSED OR HOPELESS: NOT AT ALL
1. LITTLE INTEREST OR PLEASURE IN DOING THINGS: NOT AT ALL
SUM OF ALL RESPONSES TO PHQ9 QUESTIONS 1 & 2: 0

## 2024-10-03 NOTE — PROGRESS NOTES
Presents for:   Annual Comprehensive Medical Exam:    59 y.o. female presents for annual comprehensive medical evaluation and preventive services screening.      Recent hospitalizations, surgeries or ER visits: denies      Diet: trying to eat lower carbs  Caffeine: 1 per day  Water:   t/o day  Exercise: trying to walk at times  Alcohol: denies  Tobacco: denies  Mammogram: 3/18/22 scheduled   Pelvic and Pap: [facility] Dr Lorena Garibay-2/25/2022, due 2024  Colonoscopy: 5/29/24 GI Dr Loraine Sheriff repeat 2034     Family history and social history reviewed.   Allowed to report any questions or concerns.    Cardiac workup:  Dr Darin Pate and Dr Christa Bhatia   Stress test:   Cardiac cath 8/2024-no intervention     Hyperlipidemia:   Med: Simvastatin.   Tolerating without side effects    Hypertension:   Med:  Lisinopril  Blood pressures at home: 120s/70s  Tolerating without side effects      Environmental and seasonal allergies/seasonal asthma:  Med: Singular daily   She will use ProAir if needed   Feels controlled      DM2: Rosana Shannon Pharm D  Med: Jardiance      Hypothyroidism:  Med: Levothyroxine  Tolerating without side effects     Vitamin D deficiency:   Taking prescribed Vit D  Taking MVI    Chronic lower back pain: Arthritis   Going to see PM&R Dr Brown Araan as needed   Celebrex was helpful but it was stopped because of  decrease in kidney fxn        Past Medical History:   Diagnosis Date    Acute upper respiratory infection, unspecified 09/29/2015    Viral upper respiratory tract infection with cough    Allergic     Anxiety     Arthritis     Contusion of left shoulder, initial encounter 06/23/2017    Contusion of left shoulder, initial encounter    COVID-19 virus infection 02/24/2023    We discussed her overall lingering symptoms and the other possible causes for her symptoms.  Continue to monitor. She can contact the office if she would like to be referred to the longangi COVID clinic.     Depression     Diabetes mellitus (Multi)     Disease of thyroid gland     Eczema     Encounter for gynecological examination (general) (routine) without abnormal findings     Periodic health assessment, Pap and pelvic    Hyperlipidemia     Hypertension     Major depressive disorder with single episode 2023    Pain in left shoulder 2017    Left shoulder pain    Personal history of other diseases of the musculoskeletal system and connective tissue 2017    History of neck pain    Personal history of other diseases of the musculoskeletal system and connective tissue 2017    History of back pain    Personal history of other diseases of the respiratory system     Personal history of asthma    Personal history of other endocrine, nutritional and metabolic disease 2015    History of type 2 diabetes mellitus    Personal history of other endocrine, nutritional and metabolic disease     History of diabetes mellitus    Personal history of other endocrine, nutritional and metabolic disease     History of hyperlipidemia    Personal history of other medical treatment     H/O mammogram    Personal history of other specified conditions 2015    History of painful urination    Unspecified asthma with (acute) exacerbation ([facility]) 2020    Asthma exacerbation    Unspecified fall, initial encounter 2017    Fall, accidental      Past Surgical History:   Procedure Laterality Date    BACK SURGERY  2014    Back Surgery    CARDIAC CATHETERIZATION      2024    CARDIAC CATHETERIZATION N/A 2024    Procedure: Left Heart Cath;  Surgeon: Christa Bhatia MD;  Location: [facility];  Service: Cardiovascular;  Laterality: N/A;  abn stress test     SECTION, CLASSIC  2014     Section     SECTION, LOW TRANSVERSE  1991    EYE SURGERY  1/10/21    HYSTERECTOMY  2019    Hysterectomy    OTHER SURGICAL HISTORY  2019    Meniscus repair    OTHER  SURGICAL HISTORY  05/25/2022    Complete colonoscopy    TONSILLECTOMY  04/18/2014    Tonsillectomy     Family History   Problem Relation Name Age of Onset    Arthritis Mother Tammi     Diabetes Father Bruno     Hypertension Father Bruno     Cancer Father Bruno     Breast cancer Sister  49    Hearing loss Daughter Randee     Depression Daughter Randee     Diabetes Sibling      Hypertension Sibling      Cancer Sibling        Social History     Socioeconomic History    Marital status:      Spouse name: Not on file    Number of children: Not on file    Years of education: Not on file    Highest education level: Not on file   Occupational History    Not on file   Tobacco Use    Smoking status: Never     Passive exposure: Never    Smokeless tobacco: Never   Substance and Sexual Activity    Alcohol use: Never    Drug use: Never    Sexual activity: Not Currently     Partners: Male   Other Topics Concern    Not on file   Social History Narrative    Not on file     Social Determinants of Health     Financial Resource Strain: Not on file   Food Insecurity: No Food Insecurity (4/30/2024)    Received from [facility]    Hunger Vital Sign     Worried About Running Out of Food in the Last Year: Never true     Ran Out of Food in the Last Year: Never true   Transportation Needs: No Transportation Needs (4/30/2024)    Received from [facility]    PRAPARE - Transportation     Lack of Transportation (Medical): No     Lack of Transportation (Non-Medical): No   Physical Activity: Not on file   Stress: Not on file   Social Connections: Not on file   Intimate Partner Violence: Not on file   Housing Stability: Low Risk  (4/30/2024)    Received from [facility]    Housing Stability Vital Sign     Unable to Pay for Housing in the Last Year: No     Number of Places Lived in the Last Year: 1     Unstable Housing in the Last Year: No       Current Outpatient  "Medications on File Prior to Visit   Medication Sig Dispense Refill    albuterol 90 mcg/actuation inhaler Inhale 2 puffs every 4 hours if needed for wheezing. Every 4-6 hours as needed. 18 g 2    clobetasol (Olux) 0.05 % topical foam Apply topically 2 times a day. 50 g 5    cyclobenzaprine (Flexeril) 5 mg tablet Take 1 tablet (5 mg) by mouth 3 times a day as needed for muscle spasms. 30 tablet 1    diclofenac sodium (Voltaren) 1 % gel gel Apply 4 grams to affected knee 3-4 x daily 100 g 1    empagliflozin (Jardiance) 25 mg Take 1 tablet (25 mg) by mouth once daily. 90 tablet 1    ergocalciferol (Vitamin D-2) 1.25 MG (81497 UT) capsule Take 1 capsule (50,000 Units) by mouth 1 (one) time per week. 13 capsule 3    FreeStyle lancets 28 gauge Use as instructed three times weekly 100 each 12    FreeStyle Lite Meter kit Use to check blood glucose 3 times weekly 1 each 0    FreeStyle Lite Strips strip Use to check blood glucose 3 times weekly 50 each 1    levothyroxine (Synthroid, Levoxyl) 112 mcg tablet Take 1 tablet (112 mcg) by mouth once daily. 90 tablet 1    lisinopril 40 mg tablet Take 1 tablet (40 mg) by mouth once daily. 90 tablet 1    montelukast (Singulair) 10 mg tablet Take 1 tablet (10 mg) by mouth once daily. 90 tablet 1    simvastatin (Zocor) 40 mg tablet Take 1 tablet (40 mg) by mouth once daily in the evening. 90 tablet 1     No current facility-administered medications on file prior to visit.       Allergies   Allergen Reactions    Ibuprofen Swelling     Can take asa ok    Naproxen Swelling    Nsaids (Non-Steroidal Anti-Inflammatory Drug) Hives and Swelling       Visit Vitals  /70   Pulse 71   Ht 1.676 m (5' 6\")   Wt (!) 161 kg (356 lb)   SpO2 97%   BMI 57.46 kg/m²   OB Status Postmenopausal   Smoking Status Never   BSA 2.74 m²      EKG reviewed    Physical Exam  Gen: Alert and oriented x3 female in no acute distress.  HEENT: Head is normocephalic.  Neck is supple without carotid bruits.  Heart: " Regular rate and rhythm without murmurs.  Lungs: Clear to auscultation bilaterally.  Breast:         Deferred to Gyn  Pelvic:            Deferred to Gyn   Abdomen: Soft with normal bowel sounds.  No masses or pain to palpation.    Extremities: Good range of motion of all joints.  No significant edema. Pedal pulses +1-2/4  Neuro: No signs of focal neurologic deficit.  No tremor.  Speech and hearing are normal.  Muscle Strength +5/5.  Musculoskeletal: Spine with good ROM.  Leg lengths are equal.  Skin: No significant or irregular nevi visualized.  Psych: normal affect.  No suicidal ideation.  Good judgment and insight.    Diagnosis/Plan:     1. Healthcare maintenance    - Comprehensive metabolic panel; Future  - CBC; Future  - Lipid panel; Future  - Urinalysis with Reflex Microscopic; Future  - Hemoglobin A1c; Future  - TSH; Future  - Thyroxine, Free; Future    2. Hyperlipidemia, unspecified hyperlipidemia type    - simvastatin (Zocor) 40 mg tablet; Take 1 tablet (40 mg) by mouth once daily in the evening.  Dispense: 90 tablet; Refill: 1    3. Environmental and seasonal allergies    - montelukast (Singulair) 10 mg tablet; Take 1 tablet (10 mg) by mouth once daily.  Dispense: 90 tablet; Refill: 1    4. Seasonal asthma ([facility])    - montelukast (Singulair) 10 mg tablet; Take 1 tablet (10 mg) by mouth once daily.  Dispense: 90 tablet; Refill: 1    5. Type 2 diabetes mellitus without complication, without long-term current use of insulin (Multi)  Follow up with Pharm D  - Hemoglobin A1c; Future  - empagliflozin (Jardiance) 25 mg; Take 1 tablet (25 mg) by mouth once daily.  Dispense: 90 tablet; Refill: 1    6. Hypothyroidism, unspecified type    - TSH; Future  - Thyroxine, Free; Future  - levothyroxine (Synthroid, Levoxyl) 112 mcg tablet; Take 1 tablet (112 mcg) by mouth once daily.  Dispense: 90 tablet; Refill: 1    7. Vitamin D deficiency    - Vitamin D 25-Hydroxy,Total (for eval of Vitamin D levels); Future  -  ergocalciferol (Vitamin D-2) 1.25 MG (16377 UT) capsule; Take 1 capsule (50,000 Units) by mouth 1 (one) time per week.  Dispense: 13 capsule; Refill: 3    8. Visit for screening mammogram    - BI mammo bilateral screening tomosynthesis; Future    9. Hypertension, unspecified     Discussed importance of good blood pressure control to avoid long-term complications such as heart attack and stroke.  Patient is aware that blood pressure goal is less than 130/80.   Maintaining a regular exercise program and body mass index (BMI) less than 25 as well as a diet lower in carbohydrates will help reach these goals.   If you are monitoring your blood pressure at home, please bring your blood pressure cuff at least once a year so we can complete comparative readings.  Stable.  Continue current medications.    - lisinopril 40 mg tablet; Take 1 tablet (40 mg) by mouth once daily.  Dispense: 90 tablet; Refill: 1    10. Arthritis  - cyclobenzaprine (Flexeril) 5 mg tablet; Take 1 tablet (5 mg) by mouth 3 times a day as needed for muscle spasms.  Dispense: 30 tablet; Refill: 2    11. Chronic bilateral low back pain without sciatica  Follow-up with specialist per their discretion/direction.   - cyclobenzaprine (Flexeril) 5 mg tablet; Take 1 tablet (5 mg) by mouth 3 times a day as needed for muscle spasms.  Dispense: 30 tablet; Refill: 2    12. Morbid obesity with BMI of 50.0-59.9, adult (Multi)  Patient encouraged to follow diet of lower carbohydrates and increase vegetable and fruit intake.   Patient also encouraged to increase water intake to 80 ounces/day.   Start or continue exercise for at least 30 minutes a day on most days of the week.     offers various ways to learn about healthy eating and healthy weight loss.       13. Stage 3b chronic kidney disease (CKD) (Multi)  - Comprehensive metabolic panel; Future    Medications refills will be completed as discussed.     Any labs or testing that is ordered will be reviewed and the  results will be in your chart .   You can review these via  [employer].     Follow up six months for medication management.     Prescriptions will not be filled unless you are compliant with your follow-up appointments or have a follow-up appointment scheduled as per the instruction of your provider. Refills for medications should be requested at the time of your office visit.     Please allow one week for refill requests to be completed.     Contact office with any questions or concerns.   Preferred communication is via  [employer]  Please contact [email or web address] if having issues with  [employer]    Jenny Mcknight APRN-[facility]  [address], [address]  [place] [address]  Phone: [phone]    **Charting was completed using voice recognition technology and may include unintended errors**

## 2024-10-15 ENCOUNTER — LAB (OUTPATIENT)
Dept: LAB | Facility: LAB | Age: 59
End: 2024-10-15

## 2024-10-15 DIAGNOSIS — E03.9 HYPOTHYROIDISM, UNSPECIFIED TYPE: ICD-10-CM

## 2024-10-15 DIAGNOSIS — E55.9 VITAMIN D DEFICIENCY: ICD-10-CM

## 2024-10-15 DIAGNOSIS — E78.5 HYPERLIPIDEMIA, UNSPECIFIED HYPERLIPIDEMIA TYPE: ICD-10-CM

## 2024-10-15 DIAGNOSIS — N18.32 STAGE 3B CHRONIC KIDNEY DISEASE (CKD) (MULTI): ICD-10-CM

## 2024-10-15 DIAGNOSIS — E11.9 TYPE 2 DIABETES MELLITUS WITHOUT COMPLICATION, WITHOUT LONG-TERM CURRENT USE OF INSULIN (MULTI): ICD-10-CM

## 2024-10-15 DIAGNOSIS — Z00.00 HEALTHCARE MAINTENANCE: ICD-10-CM

## 2024-10-15 LAB
25(OH)D3 SERPL-MCNC: 72 NG/ML (ref 30–100)
ALBUMIN SERPL BCP-MCNC: 4 G/DL (ref 3.4–5)
ALP SERPL-CCNC: 60 U/L (ref 33–110)
ALT SERPL W P-5'-P-CCNC: 27 U/L (ref 7–45)
ANION GAP SERPL CALC-SCNC: 15 MMOL/L (ref 10–20)
APPEARANCE UR: CLEAR
AST SERPL W P-5'-P-CCNC: 21 U/L (ref 9–39)
BILIRUB SERPL-MCNC: 0.7 MG/DL (ref 0–1.2)
BILIRUB UR STRIP.AUTO-MCNC: NEGATIVE MG/DL
BUN SERPL-MCNC: 20 MG/DL (ref 6–23)
CALCIUM SERPL-MCNC: 9.7 MG/DL (ref 8.6–10.6)
CHLORIDE SERPL-SCNC: 105 MMOL/L (ref 98–107)
CHOLEST SERPL-MCNC: 140 MG/DL (ref 0–199)
CHOLESTEROL/HDL RATIO: 4.4
CO2 SERPL-SCNC: 25 MMOL/L (ref 21–32)
COLOR UR: ABNORMAL
CREAT SERPL-MCNC: 1.18 MG/DL (ref 0.5–1.05)
EGFRCR SERPLBLD CKD-EPI 2021: 54 ML/MIN/1.73M*2
ERYTHROCYTE [DISTWIDTH] IN BLOOD BY AUTOMATED COUNT: 12.4 % (ref 11.5–14.5)
EST. AVERAGE GLUCOSE BLD GHB EST-MCNC: 169 MG/DL
GLUCOSE SERPL-MCNC: 187 MG/DL (ref 74–99)
GLUCOSE UR STRIP.AUTO-MCNC: ABNORMAL MG/DL
HBA1C MFR BLD: 7.5 %
HCT VFR BLD AUTO: 42.5 % (ref 36–46)
HDLC SERPL-MCNC: 31.5 MG/DL
HGB BLD-MCNC: 14.1 G/DL (ref 12–16)
KETONES UR STRIP.AUTO-MCNC: NEGATIVE MG/DL
LDLC SERPL CALC-MCNC: 71 MG/DL
LEUKOCYTE ESTERASE UR QL STRIP.AUTO: NEGATIVE
MCH RBC QN AUTO: 29.3 PG (ref 26–34)
MCHC RBC AUTO-ENTMCNC: 33.2 G/DL (ref 32–36)
MCV RBC AUTO: 88 FL (ref 80–100)
NITRITE UR QL STRIP.AUTO: NEGATIVE
NON HDL CHOLESTEROL: 109 MG/DL (ref 0–149)
NRBC BLD-RTO: 0 /100 WBCS (ref 0–0)
PH UR STRIP.AUTO: 5 [PH]
PLATELET # BLD AUTO: 198 X10*3/UL (ref 150–450)
POTASSIUM SERPL-SCNC: 4.5 MMOL/L (ref 3.5–5.3)
PROT SERPL-MCNC: 6.6 G/DL (ref 6.4–8.2)
PROT UR STRIP.AUTO-MCNC: NEGATIVE MG/DL
RBC # BLD AUTO: 4.81 X10*6/UL (ref 4–5.2)
RBC # UR STRIP.AUTO: NEGATIVE /UL
SODIUM SERPL-SCNC: 140 MMOL/L (ref 136–145)
SP GR UR STRIP.AUTO: 1.02
T4 FREE SERPL-MCNC: 1.59 NG/DL (ref 0.78–1.48)
TRIGL SERPL-MCNC: 187 MG/DL (ref 0–149)
TSH SERPL-ACNC: 0.08 MIU/L (ref 0.44–3.98)
UROBILINOGEN UR STRIP.AUTO-MCNC: NORMAL MG/DL
VLDL: 37 MG/DL (ref 0–40)
WBC # BLD AUTO: 7.3 X10*3/UL (ref 4.4–11.3)

## 2024-10-15 PROCEDURE — 80061 LIPID PANEL: CPT

## 2024-10-15 PROCEDURE — 84443 ASSAY THYROID STIM HORMONE: CPT

## 2024-10-15 PROCEDURE — 84439 ASSAY OF FREE THYROXINE: CPT

## 2024-10-15 PROCEDURE — 82306 VITAMIN D 25 HYDROXY: CPT

## 2024-10-15 PROCEDURE — 80053 COMPREHEN METABOLIC PANEL: CPT

## 2024-10-15 PROCEDURE — 85027 COMPLETE CBC AUTOMATED: CPT

## 2024-10-15 PROCEDURE — 81003 URINALYSIS AUTO W/O SCOPE: CPT

## 2024-10-15 PROCEDURE — 36415 COLL VENOUS BLD VENIPUNCTURE: CPT

## 2024-10-15 PROCEDURE — 83036 HEMOGLOBIN GLYCOSYLATED A1C: CPT

## 2024-10-17 ENCOUNTER — HOSPITAL ENCOUNTER (OUTPATIENT)
Dept: RADIOLOGY | Facility: CLINIC | Age: 59
Discharge: HOME | End: 2024-10-17
Payer: COMMERCIAL

## 2024-10-17 ENCOUNTER — CONSULT (OUTPATIENT)
Dept: ORTHOPEDIC SURGERY | Facility: CLINIC | Age: 59
End: 2024-10-17
Payer: COMMERCIAL

## 2024-10-17 DIAGNOSIS — M54.50 CHRONIC BILATERAL LOW BACK PAIN WITHOUT SCIATICA: ICD-10-CM

## 2024-10-17 DIAGNOSIS — M47.816 LUMBAR SPONDYLOSIS: Primary | ICD-10-CM

## 2024-10-17 DIAGNOSIS — M47.816 LUMBAR SPONDYLOSIS: ICD-10-CM

## 2024-10-17 DIAGNOSIS — M48.062 LUMBAR STENOSIS WITH NEUROGENIC CLAUDICATION: ICD-10-CM

## 2024-10-17 DIAGNOSIS — G89.29 CHRONIC BILATERAL LOW BACK PAIN WITHOUT SCIATICA: ICD-10-CM

## 2024-10-17 PROCEDURE — 72100 X-RAY EXAM L-S SPINE 2/3 VWS: CPT

## 2024-10-17 PROCEDURE — 99243 OFF/OP CNSLTJ NEW/EST LOW 30: CPT | Performed by: PHYSICAL MEDICINE & REHABILITATION

## 2024-10-17 RX ORDER — DULOXETIN HYDROCHLORIDE 30 MG/1
30 CAPSULE, DELAYED RELEASE ORAL DAILY
Qty: 30 CAPSULE | Refills: 1 | Status: SHIPPED | OUTPATIENT
Start: 2024-10-17 | End: 2024-11-16

## 2024-10-17 NOTE — PROGRESS NOTES
New Consult/New Patient Note    10/17/2024   Jenny Mcknight, APRN-*    Assessment: Very pleasant 59-year-old female presents with worsening chronic bilateral lower back pain.  Intermittent symptoms that seem to radiate down both legs.  Possible neurogenic claudication type symptoms.  -History of lumbar fusion at [facility]  -Currently suspect worsening lumbar spondylosis with possible lumbar stenosis  -Pono myofascial pain with tenderness to light touch throughout the gluteal musculature    PLAN:  1)  Imaging/Diagnostic Studies: We will obtain updated lumbar x-rays to further assess.  2)  Therapy/Rehabilitation: New consult provider physical therapy  3)  Pharmacological Management: Trying to avoid NSAIDs.  Agree with Flexeril as needed.  New Rx also provided for Cymbalta 30 mg daily which she can increase to 60 if tolerated.  4)  Spine/Surgical Interventions: None at this time  5)  Alternative Treatments: May consider alternative treatment options in the future including manipulation (chiropractor versus osteopathic) and/or acupuncture if patient does not obtain optimal relief with initial treatment plan.  6)  Consultations: Physical therapy  7)  Follow -up: 4-6 weeks or PRN if symptoms worsen/do not improve.   8)  Future treatment considerations: Lumbar MRI to further assess.  Consider trial of gabapentin or Lyrica    Patient advised of the difference between hurt and harm and advised to continue with all normal activities and exercises. Patient verbalized understanding of the above plan and was happy with the care provided.      The above clinical summary has been dictated with voice recognition software. It has not been proofread for grammatical errors, typographical mistakes, or other semantic inconsistencies.    Thank you for visiting our office today. It was our pleasure to take part in your healthcare.     Do not hesitate to call with any questions regarding your plan of care after leaving at [phone]    To clinicians, thank you very much for this kind referral. It is a privilege to partner with you in the care of your patients. My office would be delighted to assist you with any further consultations or with questions regarding the plan of care outlined. Do not hesitate to call the office or contact me directly.     Sincerely,    LALITA Ervin MD  , Physical Medicine and Rehabilitation, Orthopedic Spine  [facility] [employer] [facility]  [facility] [place]         Alicia Cotto is a 59 y.o. female who presents with worsening chronic lower back pain.  Hx of prior lumbar surgery at [facility], [id number].  Now s/p fusion as well.    Location:  Pain has been at the lower back, mostly central, at or just below the pantline  Radiation:  down both legs, with diffuse paraesthesia.  Mostly posterior but not past the knee.  No sig. Weakness   Quality:  achy  current 0-1/10,  at its worst  6-7/10  Exacerbated by standing and walking  Relieved by rest and sitting  Onset, traumatic event: no recent   Has tried:  flexeril, weight loss    Valsalva sign is neg  Grocery cart sign is pos  Smoker:  no   Does occasionally wake them at night  Litigation: none    Patient denies bowel/bladder incontinence, denies fever, denies unintentional weight loss, denies clumsiness of hands, feet, or dropping things.  Denies any constitutional or myelopathic symptomatology.      PREVIOUS TREATMENTS  IN THE LAST SIX MONTHS     Active conservative therapy  in the last six months (see below)              1. Physical therapy:   no                                                                                  2. Home exercise program after PT:  no                                                    3. A physician supervised home exercise program (HEP): no                4. Chiropractic Care:  no                                                                    Passive conservative  therapy  in the last six months (see below)              1. NSAIDS: avoiding due to prior allergy to Motrin and Ibuprofen                                                                                               2. Prescription pain medication:  flexeril                                                            3. Acupuncture:      no                                                                                       4. Tens unit:  no     Assistive Devices: none    Work status:       ROS: Other than listed in HPI, PMHX below, and intake paperwork including a 30 point patient-recorded review of symptoms which was personally reviewed and inclusive of no history of unintentional weight loss, change in appetite, significant malaise, fevers, chills, or change in bowel/bladder, shortness of breath, or chest pain.    I have confirmed and edited as necessary Past Medical, Past Surgical, Family, Social History and ROS as obtained by others. These were also obtained on new patient forms.      PHYSICAL EXAM:   GENERAL APPEARANCE:  Well nourished, well developed, and no apparent distress.  NEURO PSYCH: Patient oriented to person, place, Mood pleasant. Benign affect.  MUSCULOSKELETAL and NEUROLOGICAL       VISUAL INSPECTION           LUMBAR: WNL  SPINE ROM:   LUMBAR ROM: Full with mild pain endrange flexion extension      PALPATION:           SPINOUS PROCESS: Nontender lumbar           PARASPINALS: Tenderness bilateral lower lumbar and gluteal musculature  FACET LOADING: Mildly positive bilateral lower lumbar  MUSCLE BULK: Normal and symmetrical in the upper & lower extremities.  MUSCLE TONE: Normal  MOTOR: 5/5 in all muscle groups tested in bilateral lower extremities   SENSORY: Normal sensory exam to light touch  GAIT: Normal.  Able to go up and heels and toes with no sig. weakness.  No sig. balance deficit appreciated  REFLEXES: +1 to bilateral L extremities  Slump testing: Negative  bilateral    DATA REVIEW:   The below imaging studies were personally reviewed and discussed with the patient.    Medical Decision Making:  The above note constitutes a Moderate to High level of medical decision making based on past data and imaging review, new and chronic symptoms with exacerbation, change in weakness or sensation, new imaging and diagnostic studies ordered, discussion of potential interventional or surgical treatment options, acute or chronic pain that may pose a threat to bodily function.    Past Medical History:   Diagnosis Date    Acute upper respiratory infection, unspecified 09/29/2015    Viral upper respiratory tract infection with cough    Allergic     Anxiety     Arthritis     Contusion of left shoulder, initial encounter 06/23/2017    Contusion of left shoulder, initial encounter    COVID-19 virus infection 02/24/2023    We discussed her overall lingering symptoms and the other possible causes for her symptoms.  Continue to monitor. She can contact the office if she would like to be referred to the long-haul COVID clinic.    Depression     Diabetes mellitus (Multi)     Disease of thyroid gland     Eczema     Encounter for gynecological examination (general) (routine) without abnormal findings     Periodic health assessment, Pap and pelvic    Hyperlipidemia     Hypertension     Major depressive disorder with single episode 02/24/2023    Pain in left shoulder 05/30/2017    Left shoulder pain    Personal history of other diseases of the musculoskeletal system and connective tissue 05/30/2017    History of neck pain    Personal history of other diseases of the musculoskeletal system and connective tissue 03/23/2017    History of back pain    Personal history of other diseases of the respiratory system     Personal history of asthma    Personal history of other endocrine, nutritional and metabolic disease 01/24/2015    History of type 2 diabetes mellitus    Personal history of other endocrine,  nutritional and metabolic disease     History of diabetes mellitus    Personal history of other endocrine, nutritional and metabolic disease     History of hyperlipidemia    Personal history of other medical treatment     H/O mammogram    Personal history of other specified conditions 01/24/2015    History of painful urination    Unspecified asthma with (acute) exacerbation ([facility]) 07/01/2020    Asthma exacerbation    Unspecified fall, initial encounter 05/30/2017    Fall, accidental       Medication Documentation Review Audit       Reviewed by Brown Ervin MD (Physician) on 10/17/24 at 0904      Medication Order Taking? Sig Documenting Provider Last Dose Status   albuterol 90 mcg/actuation inhaler [id number] No Inhale 2 puffs every 4 hours if needed for wheezing. Every 4-6 hours as needed. ANTHONY Nelson Taking Active   clobetasol (Olux) 0.05 % topical foam [id number] No Apply topically 2 times a day. ANTHONY Nelson Taking Active   cyclobenzaprine (Flexeril) 5 mg tablet [id number]  Take 1 tablet (5 mg) by mouth 3 times a day as needed for muscle spasms. ANTHONY Nelson  Active   diclofenac sodium (Voltaren) 1 % gel gel [id number] No Apply 4 grams to affected knee 3-4 x daily Arian Romeo DO Taking Active   empagliflozin (Jardiance) 25 mg [id number]  Take 1 tablet (25 mg) by mouth once daily. ANTHONY Nelson  Active   ergocalciferol (Vitamin D-2) 1.25 MG (55020 UT) capsule [id number]  Take 1 capsule (50,000 Units) by mouth 1 (one) time per week. ANTHONY Nelson  Active   FreeStyle lancets 28 gauge [id number] No Use as instructed three times weekly ANTHONY Nelson Taking Active   FreeStyle Lite Meter kit [id number] No Use to check blood glucose 3 times weekly ANTHONY Nelson Taking Active   FreeStyle Lite Strips strip [id number] No Use to check blood glucose 3 times weekly ANTHONY Nelson Taking Active   levothyroxine (Synthroid, Levoxyl)  112 mcg tablet [id number]  Take 1 tablet (112 mcg) by mouth once daily. ANTHONY Nelson  Active   lisinopril 40 mg tablet [id number]  Take 1 tablet (40 mg) by mouth once daily. ANTHONY Nelson  Active   montelukast (Singulair) 10 mg tablet [id number]  Take 1 tablet (10 mg) by mouth once daily. ANTHONY Nelson  Active   simvastatin (Zocor) 40 mg tablet [id number]  Take 1 tablet (40 mg) by mouth once daily in the evening. ANTHONY Nelson  Active                    Allergies   Allergen Reactions    Ibuprofen Swelling     Can take asa ok    Metformin Other    Naproxen Swelling    Nsaids (Non-Steroidal Anti-Inflammatory Drug) Hives and Swelling       Social History     Socioeconomic History    Marital status:      Spouse name: Not on file    Number of children: Not on file    Years of education: Not on file    Highest education level: Not on file   Occupational History    Not on file   Tobacco Use    Smoking status: Never     Passive exposure: Never    Smokeless tobacco: Never   Substance and Sexual Activity    Alcohol use: Never    Drug use: Never    Sexual activity: Not Currently     Partners: Male   Other Topics Concern    Not on file   Social History Narrative    Not on file     Social Drivers of Health     Financial Resource Strain: Not on file   Food Insecurity: No Food Insecurity (4/30/2024)    Received from [facility]    Hunger Vital Sign     Worried About Running Out of Food in the Last Year: Never true     Ran Out of Food in the Last Year: Never true   Transportation Needs: No Transportation Needs (4/30/2024)    Received from [facility]    PRAPARE - Transportation     Lack of Transportation (Medical): No     Lack of Transportation (Non-Medical): No   Physical Activity: Not on file   Stress: Not on file   Social Connections: Not on file   Intimate Partner Violence: Not on file   Housing Stability: Low Risk  (4/30/2024)     [facility]    Housing Stability Vital Sign     Unable to Pay for Housing in the Last Year: No     Number of Places Lived in the Last Year: 1     Unstable Housing in the Last Year: No       Past Surgical History:   Procedure Laterality Date    BACK SURGERY  2014    Back Surgery    CARDIAC CATHETERIZATION      2024    CARDIAC CATHETERIZATION N/A 2024    Procedure: Left Heart Cath;  Surgeon: Christa Bhatia MD;  Location: [facility];  Service: Cardiovascular;  Laterality: N/A;  abn stress test     SECTION, CLASSIC  2014     Section     SECTION, LOW TRANSVERSE  1991    EYE SURGERY  1/10/21    HYSTERECTOMY  2019    Hysterectomy    OTHER SURGICAL HISTORY  2019    Meniscus repair    OTHER SURGICAL HISTORY  2022    Complete colonoscopy    TONSILLECTOMY  2014    Tonsillectomy

## 2024-11-15 ENCOUNTER — APPOINTMENT (OUTPATIENT)
Dept: RADIOLOGY | Facility: CLINIC | Age: 59
End: 2024-11-15
Payer: COMMERCIAL

## 2024-11-19 ENCOUNTER — EVALUATION (OUTPATIENT)
Dept: PHYSICAL THERAPY | Facility: CLINIC | Age: 59
End: 2024-11-19
Payer: COMMERCIAL

## 2024-11-19 ENCOUNTER — APPOINTMENT (OUTPATIENT)
Dept: PHARMACY | Facility: HOSPITAL | Age: 59
End: 2024-11-19
Payer: COMMERCIAL

## 2024-11-19 DIAGNOSIS — M54.9 BACK PAIN: Primary | ICD-10-CM

## 2024-11-19 DIAGNOSIS — M48.062 LUMBAR STENOSIS WITH NEUROGENIC CLAUDICATION: ICD-10-CM

## 2024-11-19 DIAGNOSIS — M47.816 LUMBAR SPONDYLOSIS: ICD-10-CM

## 2024-11-19 PROCEDURE — 97110 THERAPEUTIC EXERCISES: CPT | Mod: GP

## 2024-11-19 PROCEDURE — 97161 PT EVAL LOW COMPLEX 20 MIN: CPT | Mod: GP

## 2024-11-19 ASSESSMENT — PAIN SCALES - GENERAL: PAINLEVEL_OUTOF10: 5 - MODERATE PAIN

## 2024-11-19 ASSESSMENT — PAIN - FUNCTIONAL ASSESSMENT: PAIN_FUNCTIONAL_ASSESSMENT: 0-10

## 2024-11-19 NOTE — PROGRESS NOTES
Physical Therapy Evaluation and Treatment      Patient Name: Alicia Cotto  MRN: [id number]  Today's Date: 11/19/2024    Time Entry:   Time Calculation  Start Time: 1555  Stop Time: 1636  Time Calculation (min): 41 min  Visit Number:1  Approved Visits: 60  Auth required:no  $45 copay  Assessment:  PT Assessment  Rehab Prognosis: Fair  Evaluation/Treatment Tolerance: Patient tolerated treatment well   Patient presents with chief complaint of back pain and BLE weakness that began about 8 months ago. Of note, has history of lumbar fusion several years ago and largely did very well after this. Patient notes that standing/walking, lifting, kneeling/bending tends to aggravate her sxs. We started her on some hip mobility and lumbopelvic stability, which she tolerated well this date. Patient demonstrates decreased lumbar/hip ROM, decreased lumbopelvic control, decreased hip strength, decreased functional strength, impaired balance, and increased back pain, which limits her current functional ability. Patient would likely benefit from skilled outpatient PT in order to address the above deficits and return to PLOF.     Plan:  OP PT Plan  Treatment/Interventions: Cryotherapy, Dry needling, Education/ Instruction, Gait training, Hot pack, Manual therapy, Neuromuscular re-education, Self care/ home management, Therapeutic activities, Taping techniques, Therapeutic exercises  PT Plan: Skilled PT  PT Frequency: 1 time per week  Duration: 8-10 weeks  Number of Treatments Authorized: 60  Rehab Potential: Fair  Plan of Care Agreement: Patient    Current Problem:   1. Back pain  Follow Up In Physical Therapy      2. Lumbar spondylosis  Referral to Physical Therapy      3. Lumbar stenosis with neurogenic claudication  Referral to Physical Therapy          Subjective    General:  General  Reason for Referral: back pain  Referred By: Dr. Ervin  Chief complaint: back pain that began about 8 months ago. Of note, has history of lumbar  "fusion several years ago. Has been noticing some weakness in legs with standing/walking. Has to take several breaks when up and moving. Was in the hospital in May due to GI issues and bleeding and had a lot of testing, which was largely negative per patient. States that the pain begins at first and then starts to notice weakness and has to sit down as she feels that she might fall. Unsure why this began, but has been getting progressively worse. Reports that when she tries to be more active the pain is very limiting for her. Feels as if legs will give out. Does not really have any significant sensation changes -- more so weakness. Pain is Located centrally in the back maybe more so on the L. Would like get back into a regular walking program -- only able to walk about 1/4 mile at this time. Even with standing has to move around -- sitting can relieve sxs. Sometimes feels some pain in the legs -- denies \"nerve\" pain. Can have some trouble with lifting, kneeling/bending. Feels that balance is off a bit due to weakness. Takes Tylenol intermittently for pain. Was given some medication from Dr. Ervin, but had an adverse reaction, so stopped taking -- recommended telling him about this. 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 Imaging reveals fusion L4-5; mild spondylosis L2-3  PMHx: CKD, T2DM, asthma, GERD, HLD, HTN  : verified with patient  PLOF: independent without restrictions  Medications: see chart for full medication list  Patient goals for PT: reduce pain, improve walking tolerance  Medical Screening: Patient denies bowel/bladder changes, pulsatile mass in abdomen, recent infection/illness, drastic weight change, hx cancer, saddle anesthesia,    Precautions:  Precautions  Precautions Comment: none; low fall risk  Pain:  Pain Assessment  Pain Assessment: 0-10  0-10 (Numeric) Pain Score: 5 - Moderate pain      Objective   Eval Objective:  Functional " Performance:   DL Squat: good hinge at hips; slight limited range   SLS: R+L: ~3-5 seconds; lateral trunk lean B   Bed mobility: slowed pattern; slight UE support to assist  Stairs negotiation: deferred this date   Gait Analysis: slight forward trunk; lateral trunk lean B stance  Lumbar ROM:    Flexion: 75% tightness   Extension: 50% slight (+)   SB: R+L: 50%  Hip ROM: min limitation hip IR/ER B  Neuro Exam:   Dermatome exam: WNL   Myotome exam: WNL   Reflexes: not tested this date  MMT:   Hip abduction: R:4/5 L:4/5 lumbar compensation noted B   Prone hip extension: deferred this date    Knee extension: R:4+/5 L:4+/5  Palpation: hypertonicity and some ttp noted lumbar paraspinals    Outcome Measures:  Other Measures  Oswestry Disablity Index (ELI): 26     Treatments:  SKTC with sheet assist  Supine posterior capsule stretch  Supine hip ER stretch  Clamshells BTB    EDUCATION:  Outpatient Education  Individual(s) Educated: Patient  Education Provided: Home Exercise Program  Risk and Benefits Discussed with Patient/Caregiver/Other: yes  Patient/Caregiver Demonstrated Understanding: yes  Plan of Care Discussed and Agreed Upon: yes  Patient Response to Education: Patient/Caregiver Verbalized Understanding of Information  Education Comment: review HEP    Goals:  Patient will demonstrate improvement in ELI by at least 12.8 points in order to meet MCID  Patient will demonstrate full lumbar AROM without pain or compensation  Patient will demonstrate bed mobility logroll technique I without pain or compensation  Patient will demonstrate full hip ROM without pain   Patient will demonstrate at least 4+/5 glute med/max strength without pain  Patient will be able to ambulate community distances without pain or compensation  Patient will demonstrate I with HEP  Patient will be able to complete household activities, such as laundry, putting groceries away without pain or compensation  Patient will be able to play with her  grandchildren without pain or compensation  Patient will be able to perform 10 DL squats without pain or compensation  Patient will demonstrate good lumbopelvic control with dynamic movements/exercise

## 2024-11-21 ENCOUNTER — TELEMEDICINE (OUTPATIENT)
Dept: PHARMACY | Facility: HOSPITAL | Age: 59
End: 2024-11-21
Payer: COMMERCIAL

## 2024-11-21 DIAGNOSIS — E11.9 TYPE 2 DIABETES MELLITUS WITHOUT COMPLICATION, WITHOUT LONG-TERM CURRENT USE OF INSULIN (MULTI): Primary | ICD-10-CM

## 2024-11-22 NOTE — PROGRESS NOTES
Patient ID: Alicia Cotto is a 59 y.o. female who presents for Diabetes. Patient presents for a 3 month follow up.    Referring Provider: Jenny Mcknight APRN-*  PCP: LUIS E Nelson-CNP Last visit with PCP: 10/3/24 Next visit with PCP: 4/3/25      Subjective   Treatment Adherence:   Patient did take medications today.   Number of missed doses in last 7 days: 0.       Preferred pharmacy: [facility]  Can patient afford prescribed medications: Yes, Patient has no complaints     Diabetes  She presents for her follow-up diabetic visit. She has type 2 diabetes mellitus. Her disease course has been stable. There are no hypoglycemic associated symptoms. There are no diabetic associated symptoms. Current diabetic treatment includes oral agent (monotherapy).     DISCUSSION  Patient continues to tolerate her medications well  Denies any side effects  [id number]  Denies signs of hypoglycemia  Patient has been trying to reduce consumption of fast foods and to eat things in moderation   Patient recently started physical therapy for her back   Patient has no further questions or concerns     Objective     There were no vitals taken for this visit.   BP Readings from Last 4 Encounters:   10/03/24 132/70   24 132/62   24 136/86   24 122/80      There were no vitals filed for this visit.     Labs  Lab Results   Component Value Date    BILITOT 0.7 10/15/2024    CALCIUM 9.7 10/15/2024    CO2 25 10/15/2024     10/15/2024    CREATININE 1.18 (H) 10/15/2024    GLUCOSE 187 (H) 10/15/2024    ALKPHOS 60 10/15/2024    K 4.5 10/15/2024    PROT 6.6 10/15/2024     10/15/2024    AST 21 10/15/2024    ALT 27 10/15/2024    BUN 20 10/15/2024    ANIONGAP 15 10/15/2024    ALBUMIN 4.0 10/15/2024    AMYLASE 21 (L) 01/15/2018    LIPASE 15 01/15/2018    GFRF 41 (A) 2023     Lab Results   Component Value Date    TRIG 187 (H) 10/15/2024    CHOL 140 10/15/2024    LDLCALC 71 10/15/2024    HDL 31.5 10/15/2024      Lab Results   Component Value Date    HGBA1C 7.5 (H) 10/15/2024       Current Outpatient Medications   Medication Instructions    albuterol 90 mcg/actuation inhaler 2 puffs, inhalation, Every 4 hours PRN, Every 4-6 hours as needed.    clobetasol (Olux) 0.05 % topical foam Topical, 2 times daily    cyclobenzaprine (FLEXERIL) 5 mg, oral, 3 times daily PRN    diclofenac sodium (Voltaren) 1 % gel gel Apply 4 grams to affected knee 3-4 x daily    DULoxetine (CYMBALTA) 30 mg, oral, Daily, Do not crush or chew.    empagliflozin (JARDIANCE) 25 mg, oral, Daily    ergocalciferol (VITAMIN D-2) 50,000 Units, oral, Once Weekly    FreeStyle lancets 28 gauge Use as instructed three times weekly    FreeStyle Lite Meter kit Use to check blood glucose 3 times weekly    FreeStyle Lite Strips strip Use to check blood glucose 3 times weekly    levothyroxine (SYNTHROID, LEVOXYL) 112 mcg, oral, Daily    lisinopril 40 mg, oral, Daily    montelukast (SINGULAIR) 10 mg, oral, Daily    simvastatin (ZOCOR) 40 mg, oral, Every evening       Assessment/Plan     Diabetes is controlled with A1c of 6.7% (goal <7%)  CONTINUE Jardiance 25 mg once daily    Follow-up: 1/9/25 to check in on BG readings after the holidays     Time spent with pt: Total length of time 15 (minutes) of the encounter and more than 50% was spent counseling the patient.    Rosana Shannon, PharmD    Continue all meds under the continuation of care with the referring provider and clinical pharmacy team.

## 2024-11-27 ENCOUNTER — TREATMENT (OUTPATIENT)
Dept: PHYSICAL THERAPY | Facility: CLINIC | Age: 59
End: 2024-11-27
Payer: COMMERCIAL

## 2024-11-27 DIAGNOSIS — M54.9 BACK PAIN: Primary | ICD-10-CM

## 2024-11-27 PROCEDURE — 97110 THERAPEUTIC EXERCISES: CPT | Mod: GP

## 2024-11-27 ASSESSMENT — PAIN SCALES - GENERAL: PAINLEVEL_OUTOF10: 7

## 2024-11-27 ASSESSMENT — PAIN - FUNCTIONAL ASSESSMENT: PAIN_FUNCTIONAL_ASSESSMENT: 0-10

## 2024-11-27 NOTE — PROGRESS NOTES
Physical Therapy Evaluation and Treatment      Patient Name: Alicia Cotto  MRN: [id number]  Today's Date: 11/27/2024    Time Entry:   Time Calculation  Start Time: 0910  Stop Time: 0955  Time Calculation (min): 45 min  Visit Number:2  Approved Visits: 60  Auth required:no  $45 copay  Assessment:  Demonstrates slight increased trunk rotation with clamshells on LLE compared to R. Really nice hinge at hips and eccentric control with STS with band -- added to HEP to progress functional strengthening. Good scapular engagement noted with rows and band pull aparts -- added to HEP for posterior chain strengthening.     Plan:  Progress hip mobility and lumbopelvic stability as tolerated    Current Problem:   1. Back pain            Subjective    Notes that she has been working on the exercises consistently. Some good days and some bad ones. Started noticing some L groin pain one day, but not too bad now. Noticing some more pain today and wonders if due to increased activity prepping for Thanksgiving.   Imaging reveals fusion L4-5; mild spondylosis L2-3  PMHx: CKD, T2DM, asthma, GERD, HLD, HTN  Precautions:  Precautions  Precautions Comment: none; low fall risk  Pain:  Pain Assessment  Pain Assessment: 0-10  0-10 (Numeric) Pain Score: 7      Objective   Eval Objective:  Functional Performance:   DL Squat: good hinge at hips; slight limited range   SLS: R+L: ~3-5 seconds; lateral trunk lean B   Bed mobility: slowed pattern; slight UE support to assist  Stairs negotiation: deferred this date   Gait Analysis: slight forward trunk; lateral trunk lean B stance  Lumbar ROM:    Flexion: 75% tightness   Extension: 50% slight (+)   SB: R+L: 50%  Hip ROM: min limitation hip IR/ER B  Neuro Exam:   Dermatome exam: WNL   Myotome exam: WNL   Reflexes: not tested this date  MMT:   Hip abduction: R:4/5 L:4/5 lumbar compensation noted B   Prone hip extension: deferred this date    Knee extension: R:4+/5 L:4+/5  Palpation: hypertonicity and  some ttp noted lumbar paraspinals    Outcome Measures:      Not tested this date     Treatments:  Therapeutic Exercise:  SKTC with sheet assist  Supine posterior capsule stretch  Supine hip ER stretch  Clamshells BTB  STS slight elevated plinth BTB  Standing rows BTB  LAQ 4lb ankle weight   Band pull aparts BTB    EDUCATION:  Added STS slight elevated plinth BTB, standing rows with band, band pull aparts to HEP    Goals:  Patient will demonstrate improvement in ELI by at least 12.8 points in order to meet MCID  Patient will demonstrate full lumbar AROM without pain or compensation  Patient will demonstrate bed mobility logroll technique I without pain or compensation  Patient will demonstrate full hip ROM without pain   Patient will demonstrate at least 4+/5 glute med/max strength without pain  Patient will be able to ambulate community distances without pain or compensation  Patient will demonstrate I with HEP  Patient will be able to complete household activities, such as laundry, putting groceries away without pain or compensation  Patient will be able to play with her grandchildren without pain or compensation  Patient will be able to perform 10 DL squats without pain or compensation  Patient will demonstrate good lumbopelvic control with dynamic movements/exercise

## 2024-12-05 ENCOUNTER — APPOINTMENT (OUTPATIENT)
Dept: PHYSICAL THERAPY | Facility: CLINIC | Age: 59
End: 2024-12-05
Payer: COMMERCIAL

## 2024-12-12 ENCOUNTER — APPOINTMENT (OUTPATIENT)
Dept: ORTHOPEDIC SURGERY | Facility: CLINIC | Age: 59
End: 2024-12-12
Payer: COMMERCIAL

## 2024-12-12 ENCOUNTER — TREATMENT (OUTPATIENT)
Dept: PHYSICAL THERAPY | Facility: CLINIC | Age: 59
End: 2024-12-12
Payer: COMMERCIAL

## 2024-12-12 DIAGNOSIS — M54.9 BACK PAIN: Primary | ICD-10-CM

## 2024-12-12 PROCEDURE — 97110 THERAPEUTIC EXERCISES: CPT | Mod: GP

## 2024-12-12 ASSESSMENT — PAIN SCALES - GENERAL: PAINLEVEL_OUTOF10: 4

## 2024-12-12 ASSESSMENT — PAIN - FUNCTIONAL ASSESSMENT: PAIN_FUNCTIONAL_ASSESSMENT: 0-10

## 2024-12-12 NOTE — PROGRESS NOTES
Physical Therapy Evaluation and Treatment      Patient Name: Alicia Cotto  MRN: [id number]  Today's Date: 12/12/2024    Time Entry:   Time Calculation  Start Time: 1621  Stop Time: 1701  Time Calculation (min): 40 min  Visit Number:3  Approved Visits: 60  Auth required:no  $45 copay  Assessment:  Patient presents after being sick for a week or so -- unable to do the exercises regularly as a result, which is certainly understandable. She is feeling better now, so has been trying to ramp back up with the exercises, which she was feeling pretty well with prior to this flare up. Reports good muscle work with lateral band walks -- added to HEP to progress strength program. Able to maintain good upright trunk posture with cueing during standing hip abduction exercise. Improved control with standing marches able to complete without UE support    Plan:  Progress hip mobility and lumbopelvic stability as tolerated    Current Problem:   1. Back pain            Subjective    Notes that she was sick over the past week or so with a respiratory infection -- was not able to do her exercises as much as a result. Trying to get her strength back.   Imaging reveals fusion L4-5; mild spondylosis L2-3  PMHx: CKD, T2DM, asthma, GERD, HLD, HTN  Precautions:  Precautions  Precautions Comment: none; low fall risk  Pain:  Pain Assessment  Pain Assessment: 0-10  0-10 (Numeric) Pain Score: 4      Objective   Eval Objective:  Functional Performance:   DL Squat: good hinge at hips; slight limited range   SLS: R+L: ~3-5 seconds; lateral trunk lean B   Bed mobility: slowed pattern; slight UE support to assist  Stairs negotiation: deferred this date   Gait Analysis: slight forward trunk; lateral trunk lean B stance  Lumbar ROM:    Flexion: 75% tightness   Extension: 50% slight (+)   SB: R+L: 50%  Hip ROM: min limitation hip IR/ER B  Neuro Exam:   Dermatome exam: WNL   Myotome exam: WNL   Reflexes: not tested this date  MMT:   Hip abduction: R:4/5  L:4/5 lumbar compensation noted B   Prone hip extension: deferred this date    Knee extension: R:4+/5 L:4+/5  Palpation: hypertonicity and some ttp noted lumbar paraspinals    Outcome Measures:      Not tested this date     Treatments:  Therapeutic Exercise:  SKTC with sheet assist  Supine posterior capsule stretch  Supine hip ER stretch  STS slight elevated plinth BTB  Lateral band walks BTB  Standing hip abduction -- cues for upright trunk  Standing marches   LAQ 5lb ankle weight    EDUCATION:  Added lateral band walks, standing hip abduction  to HEP    Goals:  Patient will demonstrate improvement in ELI by at least 12.8 points in order to meet MCID  Patient will demonstrate full lumbar AROM without pain or compensation  Patient will demonstrate bed mobility logroll technique I without pain or compensation  Patient will demonstrate full hip ROM without pain   Patient will demonstrate at least 4+/5 glute med/max strength without pain  Patient will be able to ambulate community distances without pain or compensation  Patient will demonstrate I with HEP  Patient will be able to complete household activities, such as laundry, putting groceries away without pain or compensation  Patient will be able to play with her grandchildren without pain or compensation  Patient will be able to perform 10 DL squats without pain or compensation  Patient will demonstrate good lumbopelvic control with dynamic movements/exercise

## 2024-12-19 ENCOUNTER — HOSPITAL ENCOUNTER (OUTPATIENT)
Dept: RADIOLOGY | Facility: CLINIC | Age: 59
Discharge: HOME | End: 2024-12-19
Payer: COMMERCIAL

## 2024-12-19 ENCOUNTER — TREATMENT (OUTPATIENT)
Dept: PHYSICAL THERAPY | Facility: CLINIC | Age: 59
End: 2024-12-19
Payer: COMMERCIAL

## 2024-12-19 VITALS — WEIGHT: 293 LBS | BODY MASS INDEX: 47.09 KG/M2 | HEIGHT: 66 IN

## 2024-12-19 DIAGNOSIS — Z12.31 VISIT FOR SCREENING MAMMOGRAM: ICD-10-CM

## 2024-12-19 DIAGNOSIS — M54.9 BACK PAIN: Primary | ICD-10-CM

## 2024-12-19 PROCEDURE — 77063 BREAST TOMOSYNTHESIS BI: CPT

## 2024-12-19 PROCEDURE — 97110 THERAPEUTIC EXERCISES: CPT | Mod: GP

## 2024-12-19 ASSESSMENT — PAIN SCALES - GENERAL: PAINLEVEL_OUTOF10: 6

## 2024-12-19 ASSESSMENT — PAIN - FUNCTIONAL ASSESSMENT: PAIN_FUNCTIONAL_ASSESSMENT: 0-10

## 2024-12-19 NOTE — PROGRESS NOTES
Physical Therapy Evaluation and Treatment      Patient Name: Alicia Cotto  MRN: [id number]  Today's Date: 12/19/2024    Time Entry:   Time Calculation  Start Time: 1620  Stop Time: 1700  Time Calculation (min): 40 min  Visit Number:4  Approved Visits: 60  Auth required:no  $45 copay  Assessment:  Patient demonstrates really nice strength and control with progression to light resistance with STS exercise -- added to HEP to progress strength program. Challenged with step and hold on airex pad requiring light UE support to assist, but good pelvic control noted.     Plan:  Progress hip mobility and lumbopelvic stability as tolerated    Current Problem:   1. Back pain            Subjective    Notes that she feels that things are getting back on track since being sick. Feels stiff and achy in the back - has had a busy day with appointments   Imaging reveals fusion L4-5; mild spondylosis L2-3  PMHx: CKD, T2DM, asthma, GERD, HLD, HTN  Precautions:  Precautions  Precautions Comment: none; low fall risk  Pain:  Pain Assessment  Pain Assessment: 0-10  0-10 (Numeric) Pain Score: 6      Objective   12/19/2024 Objective:  SLB with hip abduction: no trunk lean or pelvic drop    Eval Objective:  Functional Performance:   DL Squat: good hinge at hips; slight limited range   SLS: R+L: ~3-5 seconds; lateral trunk lean B   Bed mobility: slowed pattern; slight UE support to assist  Stairs negotiation: deferred this date   Gait Analysis: slight forward trunk; lateral trunk lean B stance  Lumbar ROM:    Flexion: 75% tightness   Extension: 50% slight (+)   SB: R+L: 50%  Hip ROM: min limitation hip IR/ER B  Neuro Exam:   Dermatome exam: WNL   Myotome exam: WNL   Reflexes: not tested this date  MMT:   Hip abduction: R:4/5 L:4/5 lumbar compensation noted B   Prone hip extension: deferred this date    Knee extension: R:4+/5 L:4+/5  Palpation: hypertonicity and some ttp noted lumbar paraspinals    Outcome Measures:      Not tested this date      Treatments:  Therapeutic Exercise:  SKTC with sheet assist  LAQ 5lb ankle weight  STS slight elevated plinth 4lb med ball hold  Standing hip abduction 1lb ankle weight   Step and hold airex pad -- light UE support required  Band pull aparts GTB    EDUCATION:  Added step and hold flat surface to HEP    Goals:  Patient will demonstrate improvement in ELI by at least 12.8 points in order to meet MCID  Patient will demonstrate full lumbar AROM without pain or compensation  Patient will demonstrate bed mobility logroll technique I without pain or compensation  Patient will demonstrate full hip ROM without pain   Patient will demonstrate at least 4+/5 glute med/max strength without pain  Patient will be able to ambulate community distances without pain or compensation  Patient will demonstrate I with HEP  Patient will be able to complete household activities, such as laundry, putting groceries away without pain or compensation  Patient will be able to play with her grandchildren without pain or compensation  Patient will be able to perform 10 DL squats without pain or compensation  Patient will demonstrate good lumbopelvic control with dynamic movements/exercise

## 2024-12-27 ENCOUNTER — APPOINTMENT (OUTPATIENT)
Dept: PHYSICAL THERAPY | Facility: CLINIC | Age: 59
End: 2024-12-27
Payer: COMMERCIAL

## 2024-12-30 ENCOUNTER — TELEMEDICINE (OUTPATIENT)
Dept: PRIMARY CARE | Facility: CLINIC | Age: 59
End: 2024-12-30
Payer: COMMERCIAL

## 2024-12-30 DIAGNOSIS — J45.21 MILD INTERMITTENT ASTHMATIC BRONCHITIS WITH ACUTE EXACERBATION (HHS-HCC): Primary | ICD-10-CM

## 2024-12-30 PROCEDURE — 99214 OFFICE O/P EST MOD 30 MIN: CPT | Performed by: NURSE PRACTITIONER

## 2024-12-30 PROCEDURE — 4010F ACE/ARB THERAPY RXD/TAKEN: CPT | Performed by: NURSE PRACTITIONER

## 2024-12-30 RX ORDER — PREDNISONE 20 MG/1
40 TABLET ORAL DAILY
Qty: 10 TABLET | Refills: 0 | Status: SHIPPED | OUTPATIENT
Start: 2024-12-30 | End: 2025-01-04

## 2024-12-30 RX ORDER — ALBUTEROL SULFATE 0.83 MG/ML
2.5 SOLUTION RESPIRATORY (INHALATION) EVERY 6 HOURS PRN
Qty: 75 ML | Refills: 0 | Status: SHIPPED | OUTPATIENT
Start: 2024-12-30

## 2024-12-30 RX ORDER — AMOXICILLIN AND CLAVULANATE POTASSIUM 875; 125 MG/1; MG/1
875 TABLET, FILM COATED ORAL 2 TIMES DAILY
Qty: 20 TABLET | Refills: 0 | Status: SHIPPED | OUTPATIENT
Start: 2024-12-30 | End: 2025-01-09

## 2024-12-30 RX ORDER — ALBUTEROL SULFATE 0.83 MG/ML
2.5 SOLUTION RESPIRATORY (INHALATION)
COMMUNITY
End: 2024-12-30 | Stop reason: SDUPTHER

## 2024-12-30 NOTE — PROGRESS NOTES
Subjective   Patient ID: Alicia Cotto is a 59 y.o. female who presents for URI.    HPI     Virtual or Telephone Consent    An interactive audio and video telecommunication system which permits real time communications between the patient (at the originating site) and provider (at the distant site) was utilized to provide this telehealth service.   Verbal consent was requested and obtained from Alicia Cotto on this date, 12/30/24 for a telehealth visit.       Presents today for acute care visit via telehealth:     Reports the following symptoms since 12./23/24    Headache/Sinus pressure:  denies  Fever/chills: denies  Nasal congestion/post nasal drip: yes  Cough: moist cough  Sputum: green, yellow  Ear pain/pressure:  Sore throat: denies   Shortness of breath:  slight  Drinking to stay hydrated: yes  Fatigue: yes  Body aches:    Nicotine use:    Patient has taken:   Was using rescue inhaler but now using nebulizer  Mucinex     Steroids and     Granddaughter sick     Past Medical History:   Diagnosis Date    Acute upper respiratory infection, unspecified 09/29/2015    Viral upper respiratory tract infection with cough    Allergic     Anxiety     Arthritis     Contusion of left shoulder, initial encounter 06/23/2017    Contusion of left shoulder, initial encounter    COVID-19 virus infection 02/24/2023    We discussed her overall lingering symptoms and the other possible causes for her symptoms.  Continue to monitor. She can contact the office if she would like to be referred to the long-haul COVID clinic.    Depression     Diabetes mellitus (Multi)     Disease of thyroid gland     Eczema     Encounter for gynecological examination (general) (routine) without abnormal findings     Periodic health assessment, Pap and pelvic    Hyperlipidemia     Hypertension     Major depressive disorder with single episode 02/24/2023    Pain in left shoulder 05/30/2017    Left shoulder pain    Personal history of other diseases of  the musculoskeletal system and connective tissue 05/30/2017    History of neck pain    Personal history of other diseases of the musculoskeletal system and connective tissue 03/23/2017    History of back pain    Personal history of other diseases of the respiratory system     Personal history of asthma    Personal history of other endocrine, nutritional and metabolic disease 01/24/2015    History of type 2 diabetes mellitus    Personal history of other endocrine, nutritional and metabolic disease     History of diabetes mellitus    Personal history of other endocrine, nutritional and metabolic disease     History of hyperlipidemia    Personal history of other medical treatment     H/O mammogram    Personal history of other specified conditions 01/24/2015    History of painful urination    Unspecified asthma with (acute) exacerbation ([facility]) 07/01/2020    Asthma exacerbation    Unspecified fall, initial encounter 05/30/2017    Fall, accidental     Social History     Socioeconomic History    Marital status:      Spouse name: Not on file    Number of children: Not on file    Years of education: Not on file    Highest education level: Not on file   Occupational History    Not on file   Tobacco Use    Smoking status: Never     Passive exposure: Never    Smokeless tobacco: Never   Substance and Sexual Activity    Alcohol use: Never    Drug use: Never    Sexual activity: Not Currently     Partners: Male   Other Topics Concern    Not on file   Social History Narrative    Not on file     Social Drivers of Health     Financial Resource Strain: Not on file   Food Insecurity: No Food Insecurity (4/30/2024)    Received from [facility]    Hunger Vital Sign     Worried About Running Out of Food in the Last Year: Never true     Ran Out of Food in the Last Year: Never true   Transportation Needs: No Transportation Needs (4/30/2024)    Received from [facility]    PRAPARE -  Transportation     Lack of Transportation (Medical): No     Lack of Transportation (Non-Medical): No   Physical Activity: Not on file   Stress: Not on file   Social Connections: Not on file   Intimate Partner Violence: Not on file   Housing Stability: Low Risk  (4/30/2024)    [facility]    Housing Stability Vital Sign     Unable to Pay for Housing in the Last Year: No     Number of Places Lived in the Last Year: 1     Unstable Housing in the Last Year: No       Review of Systems    Objective   There were no vitals taken for this visit.    Physical Exam  Constitutional:       Appearance: She is ill-appearing.   Pulmonary:      Comments: Moist harsh cough noted  Speaking in full sentences  Neurological:      Mental Status: She is alert and oriented to person, place, and time.         Assessment/Plan     1. Mild intermittent asthmatic bronchitis with acute exacerbation ([facility]) (Primary)    Complete prescribed antibiotics as directed. Eat yogurt or take a probiotic (not within the hour of taking the antibiotic) while taking antibiotics.   Increase fluid intake throughout the day.    Irrigate your nose with saline spray 2-4 times per day.   Antihistamine nasal sprays (like Azelastine) also help with nasal congestion and sinus infection. Side effects of Azelastine include an occasional bitter taste. You can reduce the bitter taste by leaning forward, directing the spray toward the outside of your nose, and not sniffing for a few minutes.    Mucinex  DM for cough    - amoxicillin-pot clavulanate (Augmentin) 875-125 mg tablet; Take 1 tablet (875 mg) by mouth 2 times a day for 10 days.  Dispense: 20 tablet; Refill: 0  - predniSONE (Deltasone) 20 mg tablet; Take 2 tablets (40 mg) by mouth once daily for 5 days.  Dispense: 10 tablet; Refill: 0  - albuterol 2.5 mg /3 mL (0.083 %) nebulizer solution; Take 3 mL (2.5 mg) by nebulization every 6 hours if needed for wheezing.  Dispense: 75 mL;  Refill: 0    Contact the office if not improving after completing the antibiotics.   Consider chest xray      Contact office with any questions or concerns.   Preferred communication is via  [employer]      Call  Services: [phone] to assist with scheduling.      Jenny KIMBROUGH-[facility]  [address], [address]  [place] [address]  Phone: [phone]    **Charting was completed using voice recognition technology and may include unintended errors**

## 2025-01-03 ENCOUNTER — APPOINTMENT (OUTPATIENT)
Dept: PHYSICAL THERAPY | Facility: CLINIC | Age: 60
End: 2025-01-03
Payer: COMMERCIAL

## 2025-01-03 PROBLEM — J45.901 ASTHMATIC BRONCHITIS WITH ACUTE EXACERBATION (HHS-HCC): Status: ACTIVE | Noted: 2025-01-03

## 2025-01-09 ENCOUNTER — APPOINTMENT (OUTPATIENT)
Dept: PHARMACY | Facility: HOSPITAL | Age: 60
End: 2025-01-09
Payer: COMMERCIAL

## 2025-01-09 DIAGNOSIS — E11.9 TYPE 2 DIABETES MELLITUS WITHOUT COMPLICATION, WITHOUT LONG-TERM CURRENT USE OF INSULIN (MULTI): ICD-10-CM

## 2025-01-10 NOTE — PROGRESS NOTES
Patient ID: Alicia Cotto is a 59 y.o. female who presents for Diabetes.    Referring Provider: Jenny Mcknight APRN-*  PCP: LUIS E Nelson-CNP Last visit with PCP: 10/3/24 Next visit with PCP: 4/3/25       Subjective   Treatment Adherence:   Patient did take medications today.   Number of missed doses in last 7 days: 0.       [facility]  Can patient afford prescribed medications: Yes, Patient has no complaints     Diabetes  She presents for her follow-up diabetic visit. She has type 2 diabetes mellitus. Her disease course has been stable. There are no hypoglycemic associated symptoms. There are no diabetic associated symptoms. Current diabetic treatment includes oral agent (monotherapy).      DISCUSSION  Patient was sick for 2 weeks and had to take antibiotics and prednisone which elevated her BG  Patient feels 90% better now and was able to return to work this week  Patient states she was mindful of her diet even throughout the holidays and did not overindulge in sweets  Patient continues to tolerate Jardiance   Denies signs of UTI/yeast infection   Patient is due for another A1c check this month  Patient has no further questions or concerns     Objective     There were no vitals taken for this visit.   BP Readings from Last 4 Encounters:   10/03/24 132/70   08/16/24 132/62   07/18/24 136/86   06/14/24 122/80      There were no vitals filed for this visit.     Labs  Lab Results   Component Value Date    BILITOT 0.7 10/15/2024    CALCIUM 9.7 10/15/2024    CO2 25 10/15/2024     10/15/2024    CREATININE 1.18 (H) 10/15/2024    GLUCOSE 187 (H) 10/15/2024    ALKPHOS 60 10/15/2024    K 4.5 10/15/2024    PROT 6.6 10/15/2024     10/15/2024    AST 21 10/15/2024    ALT 27 10/15/2024    BUN 20 10/15/2024    ANIONGAP 15 10/15/2024    ALBUMIN 4.0 10/15/2024    AMYLASE 21 (L) 01/15/2018    LIPASE 15 01/15/2018    GFRF 41 (A) 03/30/2023     Lab Results   Component Value Date    TRIG  187 (H) 10/15/2024    CHOL 140 10/15/2024    LDLCALC 71 10/15/2024    HDL 31.5 10/15/2024     Lab Results   Component Value Date    HGBA1C 7.5 (H) 10/15/2024       Current Outpatient Medications   Medication Instructions    albuterol 90 mcg/actuation inhaler 2 puffs, inhalation, Every 4 hours PRN, Every 4-6 hours as needed.    albuterol 2.5 mg, nebulization, Every 6 hours PRN    clobetasol (Olux) 0.05 % topical foam Topical, 2 times daily    cyclobenzaprine (FLEXERIL) 5 mg, oral, 3 times daily PRN    diclofenac sodium (Voltaren) 1 % gel gel Apply 4 grams to affected knee 3-4 x daily    DULoxetine (CYMBALTA) 30 mg, oral, Daily, Do not crush or chew.    empagliflozin (JARDIANCE) 25 mg, oral, Daily    ergocalciferol (VITAMIN D-2) 50,000 Units, oral, Once Weekly    FreeStyle lancets 28 gauge Use as instructed three times weekly    FreeStyle Lite Meter kit Use to check blood glucose 3 times weekly    FreeStyle Lite Strips strip Use to check blood glucose 3 times weekly    levothyroxine (SYNTHROID, LEVOXYL) 112 mcg, oral, Daily    lisinopril 40 mg, oral, Daily    montelukast (SINGULAIR) 10 mg, oral, Daily    simvastatin (ZOCOR) 40 mg, oral, Every evening       Assessment/Plan   Problem List Items Addressed This Visit             ICD-10-CM    Type 2 diabetes mellitus without complication, without long-term current use of insulin (Multi) E11.9     Diabetes is uncontrolled with A1c of 7.5% (goal <7%)  CONTINUE Jardiance 25 mg once daily  Please stop by lab for A1c check               Follow-up: 2/6     Time spent with pt: Total length of time 15 (minutes) of the encounter and more than 50% was spent counseling the patient.    Rosana Shannon, PharmD    Continue all meds under the continuation of care with the referring provider and clinical pharmacy team.

## 2025-01-10 NOTE — ASSESSMENT & PLAN NOTE
Diabetes is uncontrolled with A1c of 7.5% (goal <7%)  CONTINUE Jardiance 25 mg once daily  Please stop by lab for A1c check

## 2025-01-16 ENCOUNTER — APPOINTMENT (OUTPATIENT)
Dept: ORTHOPEDIC SURGERY | Facility: CLINIC | Age: 60
End: 2025-01-16
Payer: COMMERCIAL

## 2025-01-22 ENCOUNTER — TELEMEDICINE (OUTPATIENT)
Dept: PRIMARY CARE | Facility: CLINIC | Age: 60
End: 2025-01-22
Payer: COMMERCIAL

## 2025-01-22 DIAGNOSIS — B37.9 ANTIBIOTIC-INDUCED YEAST INFECTION: Primary | ICD-10-CM

## 2025-01-22 DIAGNOSIS — T36.95XA ANTIBIOTIC-INDUCED YEAST INFECTION: Primary | ICD-10-CM

## 2025-01-22 PROCEDURE — 1036F TOBACCO NON-USER: CPT | Performed by: NURSE PRACTITIONER

## 2025-01-22 PROCEDURE — 99213 OFFICE O/P EST LOW 20 MIN: CPT | Performed by: NURSE PRACTITIONER

## 2025-01-22 PROCEDURE — 4010F ACE/ARB THERAPY RXD/TAKEN: CPT | Performed by: NURSE PRACTITIONER

## 2025-01-22 RX ORDER — FLUCONAZOLE 150 MG/1
TABLET ORAL
Qty: 3 TABLET | Refills: 0 | Status: SHIPPED | OUTPATIENT
Start: 2025-01-22

## 2025-01-22 NOTE — PROGRESS NOTES
Subjective   Patient ID: Alicia Cotto is a 59 y.o. female who presents for vaginal swelling and itching after taking antibiotic.    Virtual or Telephone Consent    An interactive audio and video telecommunication system which permits real time communications between the patient (at the originating site) and provider (at the distant site) was utilized to provide this telehealth service.   Verbal consent was requested and obtained from Alicia Cotto on this date, 01/22/25 for a telehealth visit.     HPI   Tx with 10 days of antibiotics starting 12/31/24.   Noticed at the very end of the antibiotics she developed a mild discharge.  Has developed redness, inflammation and itching. Feels uncomfortable.   Tried OTC Vagisil.   Concerned because she is a diabetic taking Jardiance.   No fever.     Past Medical History:   Diagnosis Date    Acute upper respiratory infection, unspecified 09/29/2015    Viral upper respiratory tract infection with cough    Allergic     Anxiety     Arthritis     Contusion of left shoulder, initial encounter 06/23/2017    Contusion of left shoulder, initial encounter    COVID-19 virus infection 02/24/2023    We discussed her overall lingering symptoms and the other possible causes for her symptoms.  Continue to monitor. She can contact the office if she would like to be referred to the long-haul COVID clinic.    Depression     Diabetes mellitus (Multi)     Disease of thyroid gland     Eczema     Encounter for gynecological examination (general) (routine) without abnormal findings     Periodic health assessment, Pap and pelvic    Hyperlipidemia     Hypertension     Major depressive disorder with single episode 02/24/2023    Pain in left shoulder 05/30/2017    Left shoulder pain    Personal history of other diseases of the musculoskeletal system and connective tissue 05/30/2017    History of neck pain    Personal history of other diseases of the musculoskeletal system and connective tissue  03/23/2017    History of back pain    Personal history of other diseases of the respiratory system     Personal history of asthma    Personal history of other endocrine, nutritional and metabolic disease 01/24/2015    History of type 2 diabetes mellitus    Personal history of other endocrine, nutritional and metabolic disease     History of diabetes mellitus    Personal history of other endocrine, nutritional and metabolic disease     History of hyperlipidemia    Personal history of other medical treatment     H/O mammogram    Personal history of other specified conditions 01/24/2015    History of painful urination    Unspecified asthma with (acute) exacerbation ([facility]) 07/01/2020    Asthma exacerbation    Unspecified fall, initial encounter 05/30/2017    Fall, accidental         Review of Systems    Objective   There were no vitals taken for this visit.    Physical Exam    Alert and oriented x 3  Appears healthy  Does not appear/sound dyspneic with conversation  Speech clear.  Hearing adequate.  Psych: Normal affect. Good judgment and insight.       Assessment/Plan     1. Antibiotic-induced yeast infection (Primary)  - fluconazole (Diflucan) 150 mg tablet; Take 150 mg po q72 hrs  Dispense: 3 tablet; Refill: 0  Apply ointment (ex diaper ointment) to area to form a barrier   Avoid excessive heat and moisture  Wash area daily and pat dry  Continue to keep blood sugars stable      Contact office with any questions or concerns or if treatment does not work.   Preferred communication is via  [employer]      Call  Services: [phone] to assist with scheduling.      Jenny KIMBROUGH-[facility]  [address], [address]  [place] [address]  Phone: [phone]    **Charting was completed using voice recognition technology and may include unintended errors**

## 2025-02-06 ENCOUNTER — APPOINTMENT (OUTPATIENT)
Dept: PHARMACY | Facility: HOSPITAL | Age: 60
End: 2025-02-06
Payer: COMMERCIAL

## 2025-02-06 DIAGNOSIS — E11.9 TYPE 2 DIABETES MELLITUS WITHOUT COMPLICATION, WITHOUT LONG-TERM CURRENT USE OF INSULIN (MULTI): ICD-10-CM

## 2025-02-06 NOTE — PROGRESS NOTES
Patient ID: Alicai Cotto is a 59 y.o. female who presents for Diabetes.    Referring Provider: Jenny Mcknight APRN-*  PCP: LUIS E Nelson-CNP Last visit with PCP: 25 Next visit with PCP: 3/27/25      Subjective   Treatment Adherence:   Patient did take medications today.   Number of missed doses in last 7 days: 0.       Preferred pharmacy: [email or web address]  Can patient afford prescribed medications: Yes, Patient has no complaints     Diabetes  She presents for her follow-up diabetic visit. She has type 2 diabetes mellitus. Her disease course has been stable. There are no hypoglycemic associated symptoms. There are no diabetic associated symptoms. Current diabetic treatment includes oral agent (monotherapy).      DISCUSSION  Patient is feeling better since last month and has recovered sick  FB-95  Denies signs of hypoglycemia   Patient has lost ~40 lb since April (10 months ago) mainly by changing her diet  Portion control  Low carb  Low sugar   Adding more protein   Patient continues to do exercises recommended in PT at home and stretches  Patient has no further questions or concerns    Objective     There were no vitals taken for this visit.   BP Readings from Last 4 Encounters:   10/03/24 132/70   24 132/62   24 136/86   24 122/80      There were no vitals filed for this visit.     Labs  Lab Results   Component Value Date    BILITOT 0.7 10/15/2024    CALCIUM 9.7 10/15/2024    CO2 25 10/15/2024     10/15/2024    CREATININE 1.18 (H) 10/15/2024    GLUCOSE 187 (H) 10/15/2024    ALKPHOS 60 10/15/2024    K 4.5 10/15/2024    PROT 6.6 10/15/2024     10/15/2024    AST 21 10/15/2024    ALT 27 10/15/2024    BUN 20 10/15/2024    ANIONGAP 15 10/15/2024    ALBUMIN 4.0 10/15/2024    AMYLASE 21 (L) 01/15/2018    LIPASE 15 01/15/2018    GFRF 41 (A) 2023     Lab Results   Component Value Date    TRIG 187 (H) 10/15/2024    CHOL 140 10/15/2024    LDLCALC 71 10/15/2024    HDL  31.5 10/15/2024     Lab Results   Component Value Date    HGBA1C 7.5 (H) 10/15/2024       Current Outpatient Medications   Medication Instructions    albuterol 90 mcg/actuation inhaler 2 puffs, inhalation, Every 4 hours PRN, Every 4-6 hours as needed.    albuterol 2.5 mg, nebulization, Every 6 hours PRN    clobetasol (Olux) 0.05 % topical foam Topical, 2 times daily    cyclobenzaprine (FLEXERIL) 5 mg, oral, 3 times daily PRN    diclofenac sodium (Voltaren) 1 % gel gel Apply 4 grams to affected knee 3-4 x daily    DULoxetine (CYMBALTA) 30 mg, oral, Daily, Do not crush or chew.    empagliflozin (JARDIANCE) 25 mg, oral, Daily    ergocalciferol (VITAMIN D-2) 50,000 Units, oral, Once Weekly    fluconazole (Diflucan) 150 mg tablet Take 150 mg po q72 hrs    FreeStyle lancets 28 gauge Use as instructed three times weekly    FreeStyle Lite Meter kit Use to check blood glucose 3 times weekly    FreeStyle Lite Strips strip Use to check blood glucose 3 times weekly    levothyroxine (SYNTHROID, LEVOXYL) 112 mcg, oral, Daily    lisinopril 40 mg, oral, Daily    montelukast (SINGULAIR) 10 mg, oral, Daily    simvastatin (ZOCOR) 40 mg, oral, Every evening         Assessment/Plan   Problem List Items Addressed This Visit             ICD-10-CM    Type 2 diabetes mellitus without complication, without long-term current use of insulin (Multi) E11.9     Diabetes has improved with fasting blood glucose readings ranging 90-95 (goal )  CONTINUE Jardiance 25 mg once daily              Follow-up: 4/10     Time spent with pt: Total length of time 15 (minutes) of the encounter and more than 50% was spent counseling the patient.    Rosana Shannon, PharmD    Continue all meds under the continuation of care with the referring provider and clinical pharmacy team.

## 2025-02-06 NOTE — ASSESSMENT & PLAN NOTE
Diabetes has improved with fasting blood glucose readings ranging 90-95 (goal )  CONTINUE Jardiance 25 mg once daily

## 2025-02-27 ENCOUNTER — APPOINTMENT (OUTPATIENT)
Dept: ORTHOPEDIC SURGERY | Facility: CLINIC | Age: 60
End: 2025-02-27
Payer: COMMERCIAL

## 2025-03-24 LAB
ALBUMIN SERPL-MCNC: 4.3 G/DL (ref 3.6–5.1)
ALP SERPL-CCNC: 55 U/L (ref 37–153)
ALT SERPL-CCNC: 26 U/L (ref 6–29)
ANION GAP SERPL CALCULATED.4IONS-SCNC: 13 MMOL/L (CALC) (ref 7–17)
AST SERPL-CCNC: 19 U/L (ref 10–35)
BILIRUB SERPL-MCNC: 0.9 MG/DL (ref 0.2–1.2)
BUN SERPL-MCNC: 31 MG/DL (ref 7–25)
CALCIUM SERPL-MCNC: 9.7 MG/DL (ref 8.6–10.4)
CHLORIDE SERPL-SCNC: 106 MMOL/L (ref 98–110)
CHOLEST SERPL-MCNC: 136 MG/DL
CHOLEST/HDLC SERPL: 3.8 (CALC)
CO2 SERPL-SCNC: 20 MMOL/L (ref 20–32)
CREAT SERPL-MCNC: 1.15 MG/DL (ref 0.5–1.03)
EGFRCR SERPLBLD CKD-EPI 2021: 55 ML/MIN/1.73M2
GLUCOSE SERPL-MCNC: 214 MG/DL (ref 65–99)
HDLC SERPL-MCNC: 36 MG/DL
LDLC SERPL CALC-MCNC: 72 MG/DL (CALC)
NONHDLC SERPL-MCNC: 100 MG/DL (CALC)
POTASSIUM SERPL-SCNC: 4.7 MMOL/L (ref 3.5–5.3)
PROT SERPL-MCNC: 6.7 G/DL (ref 6.1–8.1)
SODIUM SERPL-SCNC: 139 MMOL/L (ref 135–146)
TRIGL SERPL-MCNC: 179 MG/DL

## 2025-03-25 LAB
ALBUMIN/CREAT UR: NORMAL MG/G CREAT
CREAT UR-MCNC: 65 MG/DL (ref 20–275)
EST. AVERAGE GLUCOSE BLD GHB EST-MCNC: 243 MG/DL
EST. AVERAGE GLUCOSE BLD GHB EST-SCNC: 13.5 MMOL/L
HBA1C MFR BLD: 10.1 % OF TOTAL HGB
MICROALBUMIN UR-MCNC: <0.2 MG/DL
T4 FREE SERPL-MCNC: 1.7 NG/DL (ref 0.8–1.8)
TSH SERPL-ACNC: 0.07 MIU/L (ref 0.4–4.5)

## 2025-03-27 ENCOUNTER — APPOINTMENT (OUTPATIENT)
Dept: PRIMARY CARE | Facility: CLINIC | Age: 60
End: 2025-03-27
Payer: COMMERCIAL

## 2025-03-27 VITALS
OXYGEN SATURATION: 98 % | DIASTOLIC BLOOD PRESSURE: 78 MMHG | SYSTOLIC BLOOD PRESSURE: 120 MMHG | HEART RATE: 73 BPM | HEIGHT: 66 IN | WEIGHT: 293 LBS | BODY MASS INDEX: 47.09 KG/M2

## 2025-03-27 DIAGNOSIS — E03.9 HYPOTHYROIDISM, UNSPECIFIED TYPE: ICD-10-CM

## 2025-03-27 DIAGNOSIS — G89.29 CHRONIC BILATERAL LOW BACK PAIN WITHOUT SCIATICA: ICD-10-CM

## 2025-03-27 DIAGNOSIS — E11.9 TYPE 2 DIABETES MELLITUS WITHOUT COMPLICATION, WITHOUT LONG-TERM CURRENT USE OF INSULIN: ICD-10-CM

## 2025-03-27 DIAGNOSIS — E11.22 CKD STAGE 3 SECONDARY TO DIABETES (MULTI): ICD-10-CM

## 2025-03-27 DIAGNOSIS — R42 VERTIGO: ICD-10-CM

## 2025-03-27 DIAGNOSIS — I10 HYPERTENSION, UNSPECIFIED TYPE: ICD-10-CM

## 2025-03-27 DIAGNOSIS — J45.998 SEASONAL ASTHMA (HHS-HCC): ICD-10-CM

## 2025-03-27 DIAGNOSIS — E55.9 VITAMIN D DEFICIENCY: ICD-10-CM

## 2025-03-27 DIAGNOSIS — J30.89 ENVIRONMENTAL AND SEASONAL ALLERGIES: Primary | ICD-10-CM

## 2025-03-27 DIAGNOSIS — Z23 NEED FOR PNEUMOCOCCAL 20-VALENT CONJUGATE VACCINATION: ICD-10-CM

## 2025-03-27 DIAGNOSIS — K76.0 FATTY LIVER: ICD-10-CM

## 2025-03-27 DIAGNOSIS — M54.50 CHRONIC BILATERAL LOW BACK PAIN WITHOUT SCIATICA: ICD-10-CM

## 2025-03-27 DIAGNOSIS — E78.5 HYPERLIPIDEMIA, UNSPECIFIED HYPERLIPIDEMIA TYPE: ICD-10-CM

## 2025-03-27 DIAGNOSIS — N18.30 CKD STAGE 3 SECONDARY TO DIABETES (MULTI): ICD-10-CM

## 2025-03-27 PROCEDURE — 90677 PCV20 VACCINE IM: CPT | Performed by: NURSE PRACTITIONER

## 2025-03-27 PROCEDURE — 3008F BODY MASS INDEX DOCD: CPT | Performed by: NURSE PRACTITIONER

## 2025-03-27 PROCEDURE — 4010F ACE/ARB THERAPY RXD/TAKEN: CPT | Performed by: NURSE PRACTITIONER

## 2025-03-27 PROCEDURE — 3078F DIAST BP <80 MM HG: CPT | Performed by: NURSE PRACTITIONER

## 2025-03-27 PROCEDURE — 1036F TOBACCO NON-USER: CPT | Performed by: NURSE PRACTITIONER

## 2025-03-27 PROCEDURE — 90471 IMMUNIZATION ADMIN: CPT | Performed by: NURSE PRACTITIONER

## 2025-03-27 PROCEDURE — 99214 OFFICE O/P EST MOD 30 MIN: CPT | Performed by: NURSE PRACTITIONER

## 2025-03-27 PROCEDURE — 3074F SYST BP LT 130 MM HG: CPT | Performed by: NURSE PRACTITIONER

## 2025-03-27 RX ORDER — SIMVASTATIN 40 MG/1
40 TABLET, FILM COATED ORAL EVERY EVENING
Qty: 90 TABLET | Refills: 1 | Status: SHIPPED | OUTPATIENT
Start: 2025-03-27

## 2025-03-27 RX ORDER — MONTELUKAST SODIUM 10 MG/1
10 TABLET ORAL DAILY
Qty: 90 TABLET | Refills: 1 | Status: SHIPPED | OUTPATIENT
Start: 2025-03-27

## 2025-03-27 RX ORDER — LISINOPRIL 40 MG/1
40 TABLET ORAL DAILY
Qty: 90 TABLET | Refills: 1 | Status: SHIPPED | OUTPATIENT
Start: 2025-03-27

## 2025-03-27 RX ORDER — ERGOCALCIFEROL 1.25 MG/1
50000 CAPSULE ORAL
Qty: 13 CAPSULE | Refills: 3 | Status: SHIPPED | OUTPATIENT
Start: 2025-03-30 | End: 2026-03-30

## 2025-03-27 RX ORDER — LEVOTHYROXINE SODIUM 112 UG/1
112 TABLET ORAL DAILY
Qty: 90 TABLET | Refills: 1 | Status: SHIPPED | OUTPATIENT
Start: 2025-03-27

## 2025-03-27 ASSESSMENT — PATIENT HEALTH QUESTIONNAIRE - PHQ9
SUM OF ALL RESPONSES TO PHQ9 QUESTIONS 1 & 2: 0
1. LITTLE INTEREST OR PLEASURE IN DOING THINGS: NOT AT ALL
2. FEELING DOWN, DEPRESSED OR HOPELESS: NOT AT ALL

## 2025-03-27 NOTE — PROGRESS NOTES
Subjective   Patient ID: Alicia Cotto is a 59 y.o. female who presents for Med mgmt.    HPI     Recent hospitalizations, surgeries or ER visits: denies      Diet: eating very healthy   Caffeine: 1 per day  Water:   t/o day  Exercise: trying to walk   Alcohol: denies  Tobacco: denies  Mammogram:12/19/24  Pelvic and Pap:  hysterectomy [facility] Dr Lorena Garibay-2/25/2022  Colonoscopy: 5/29/24 GI Dr Loraine Sheriff repeat 2034     Family history and social history reviewed.   Allowed to report any questions or concerns.    Working very hard to lose weight  Following DM diet   Overall feeling better   3/20/24 weight was 378 lbs, today 332 lbs!     Cardiac workup:  Dr Darin Pate and Dr Christa Bhatia   Stress test: 8/24 abnormal   Cardiac cath 8/2024- normal coronary arteries and a normal LVEF      Hyperlipidemia:   Med: Simvastatin 40 mg  Tolerating without side effects     Hypertension:   Med:  Lisinopril 40 mg   Blood pressures at home: 120s/70s  Tolerating without side effects      Environmental and seasonal allergies/seasonal asthma:  Med: Singular daily   She will use ProAir if needed   Feels controlled      DM2: Rosana Shannon Pharm D  Med: Jardiance   Would like to speak with Pharm D about changing meds      Hypothyroidism:  Med: Levothyroxine 112 mcg  Tolerating without side effects     Vitamin D deficiency:   Taking prescribed Vit D  Taking MVI     Lumbar spondylosis, Arthritis:  PM&R Dr Brown Ervin   Meds: Flexeril  Cymbalta stopped r/t side effects   Celebrex was helpful but it was stopped because of  decrease in kidney fxn    Fatty Liver: [place]     Vertigo:   Evaluated by either ENT or neuro in the past   Noticed she will experience when flipping over to right side while in bed  Epley maneuver has worked in the past     Past Medical History:   Diagnosis Date    Acute upper respiratory infection, unspecified 09/29/2015    Viral upper respiratory tract infection with cough    Allergic     Anxiety      Arthritis     Contusion of left shoulder, initial encounter 06/23/2017    Contusion of left shoulder, initial encounter    COVID-19 virus infection 02/24/2023    We discussed her overall lingering symptoms and the other possible causes for her symptoms.  Continue to monitor. She can contact the office if she would like to be referred to the long-haul COVID clinic.    Depression     Diabetes mellitus (Multi)     Disease of thyroid gland     Eczema     Encounter for gynecological examination (general) (routine) without abnormal findings     Periodic health assessment, Pap and pelvic    Hyperlipidemia     Hypertension     Major depressive disorder with single episode 02/24/2023    Pain in left shoulder 05/30/2017    Left shoulder pain    Personal history of other diseases of the musculoskeletal system and connective tissue 05/30/2017    History of neck pain    Personal history of other diseases of the musculoskeletal system and connective tissue 03/23/2017    History of back pain    Personal history of other diseases of the respiratory system     Personal history of asthma    Personal history of other endocrine, nutritional and metabolic disease 01/24/2015    History of type 2 diabetes mellitus    Personal history of other endocrine, nutritional and metabolic disease     History of diabetes mellitus    Personal history of other endocrine, nutritional and metabolic disease     History of hyperlipidemia    Personal history of other medical treatment     H/O mammogram    Personal history of other specified conditions 01/24/2015    History of painful urination    Unspecified asthma with (acute) exacerbation ([facility]) 07/01/2020    Asthma exacerbation    Unspecified fall, initial encounter 05/30/2017    Fall, accidental     Past Surgical History:   Procedure Laterality Date    BACK SURGERY  03/31/2014    Back Surgery    CARDIAC CATHETERIZATION      8/2024    CARDIAC CATHETERIZATION N/A 08/16/2024    Procedure: Left Heart  Cath;  Surgeon: Christa Bhatia MD;  Location: [facility];  Service: Cardiovascular;  Laterality: N/A;  abn stress test     SECTION, CLASSIC  2014     Section     SECTION, LOW TRANSVERSE      COLONOSCOPY  2024    EYE SURGERY  1/10/21    HYSTERECTOMY  2019    Hysterectomy    OTHER SURGICAL HISTORY  2019    Meniscus repair    OTHER SURGICAL HISTORY  2022    Complete colonoscopy    TONSILLECTOMY  2014    Tonsillectomy     Social History     Socioeconomic History    Marital status:      Spouse name: Not on file    Number of children: Not on file    Years of education: Not on file    Highest education level: Not on file   Occupational History    Not on file   Tobacco Use    Smoking status: Never     Passive exposure: Never    Smokeless tobacco: Never   Substance and Sexual Activity    Alcohol use: Never    Drug use: Never    Sexual activity: Not Currently     Partners: Male   Other Topics Concern    Not on file   Social History Narrative    Not on file     Social Drivers of Health     Financial Resource Strain: Not on file   Food Insecurity: No Food Insecurity (2024)    Received from [facility]    Hunger Vital Sign     Worried About Running Out of Food in the Last Year: Never true     Ran Out of Food in the Last Year: Never true   Transportation Needs: No Transportation Needs (2024)    Received from [facility]    PRAPARE - Transportation     Lack of Transportation (Medical): No     Lack of Transportation (Non-Medical): No   Physical Activity: Not on file   Stress: Not on file   Social Connections: Not on file   Intimate Partner Violence: Not on file   Housing Stability: Low Risk  (2024)    Received from [facility]    Housing Stability Vital Sign     Unable to Pay for Housing in the Last Year: No     Number of Places Lived in the Last Year: 1     Unstable  "Housing in the Last Year: No     Family History   Problem Relation Name Age of Onset    Arthritis Mother Tammi     Diabetes Father Bruno     Hypertension Father Bruno     Cancer Father Bruno     Breast cancer Sister  49    Hearing loss Daughter Randee     Depression Daughter Randee     Diabetes Sibling      Hypertension Sibling      Cancer Sibling         Review of Systems    Objective   /78   Pulse 73   Ht 1.676 m (5' 6\")   Wt (!) 151 kg (332 lb)   SpO2 98%   BMI 53.59 kg/m²     Physical Exam    Alert and oriented x 3  Neck supple without carotid bruit   Heart regular rate and rhythm without murmur  Lungs clear to auscultation.  Gait is non-antalgic  Speech clear.  Hearing adequate.  Psych: Normal affect. Good judgment and insight.       Assessment/Plan     1. Hyperlipidemia, unspecified hyperlipidemia type  Labs were reviewed with patient and will be available in chart.  Stable.  Continue current medications.  - simvastatin (Zocor) 40 mg tablet; Take 1 tablet (40 mg) by mouth once daily in the evening.  Dispense: 90 tablet; Refill: 1    2. Hypertension, unspecified type     Discussed importance of good blood pressure control to avoid long-term complications such as heart attack and stroke.  Patient is aware that blood pressure goal is less than 130/80.   Maintaining a regular exercise program and body mass index (BMI) less than 25 as well as a diet lower in carbohydrates will help reach these goals.   If you are monitoring your blood pressure at home, please bring your blood pressure cuff at least once a year so we can complete comparative readings.  Stable.  Continue current medications.    - lisinopril 40 mg tablet; Take 1 tablet (40 mg) by mouth once daily.  Dispense: 90 tablet; Refill: 1    3. Hypothyroidism, unspecified type  Labs were reviewed with patient and will be available in chart.    Stable.  Continue current medications.  - levothyroxine (Synthroid, Levoxyl) 112 mcg tablet; Take 1 " tablet (112 mcg) by mouth once daily.  Dispense: 90 tablet; Refill: 1    4. Environmental and seasonal allergies (Primary)  Stable.  Continue current medications.  - montelukast (Singulair) 10 mg tablet; Take 1 tablet (10 mg) by mouth once daily.  Dispense: 90 tablet; Refill: 1    5. Type 2 diabetes mellitus without complication, without long-term current use of insulin (Multi)  DM2:   Aware of diabetes goals including hemoglobin A1c less than 7%, blood pressure less than 130/80, and LDL cholesterol less than 70.   Discussed importance of daily exercise as well as following an American Diabetes Association diet for best control.  Also discussed potential complications due to uncontrolled diabetes including heart attack, stroke, kidney failure, peripheral neuropathy, impaired vision and amputations. We recommend daily skin checks to look for new open areas . We recommend regular visits to a dentist,  podiatrist and an optometrist.      Helpful Websites:     http://www.myplate.gov    Https://diabetes.org/food-nutrition/understanding-carbs/carb-counting-and-diabetes     http://OpenSynergy: resource for finding low-carb meal plans, snack lists and tons of recipes.    https://www.heart.org/en/healthy-living/healthy-lifestyle/lifes-essential-8: American Heart Association's Website     [facility] offers FREE diabetes retreats:   Search:   Diabetes and Metabolic Care [place] [facility]                                        OR   [email or web address]     Our wellness retreat features seminars, demonstrations and activities focusing on important diabetes, prediabetes and obesity self-management topics.    Follow up with Pharm D to make med changes   Discuss obtaining new glucose monitor during visit      6. Seasonal asthma ([facility])  - montelukast (Singulair) 10 mg tablet; Take 1 tablet (10 mg) by mouth once  daily.  Dispense: 90 tablet; Refill: 1    7. CKD stage 3 secondary to diabetes (Multi)  Labs were reviewed with patient and will be available in chart.  stable    8. Vitamin D deficiency    - ergocalciferol (Vitamin D-2) 1250 mcg (50,000 units) capsule; Take 1 capsule (50,000 Units) by mouth 1 (one) time per week.  Dispense: 13 capsule; Refill: 3    9. Chronic bilateral low back pain without sciatica  Follow-up with specialist per their discretion/direction.     10. Fatty liver  Follow-up with specialist per their discretion/direction.     11. Vertigo  Continue Epley's Maneuver   Discussed taking OTC non drowsy Meclizine (she has it at home)    12. Need for pneumococcal 20-valent conjugate vaccination    - Pneumococcal conjugate vaccine, 20-valent (PREVNAR 20)    Follow up: 10/4/25 CPE     Medications refills will be completed as discussed.     Any labs or testing that is ordered will be reviewed and the results will be in your chart .   You can review these via  [employer].     Prescriptions will not be filled unless you are compliant with your follow-up appointments or have a follow-up appointment scheduled as per the instruction of your provider. Refills for medications should be requested at the time of your office visit.     Please allow one week for refill requests to be completed.     Contact office with any questions or concerns.   Preferred communication is via  [employer]      Call  Services: [phone] to assist with scheduling.      Jenny KIMBROUGH-[facility]  [address]  [place] [address]  Phone: [phone]    **Charting was completed using voice recognition technology and may include unintended errors**

## 2025-04-03 ENCOUNTER — APPOINTMENT (OUTPATIENT)
Dept: PRIMARY CARE | Facility: CLINIC | Age: 60
End: 2025-04-03
Payer: COMMERCIAL

## 2025-04-09 NOTE — PROGRESS NOTES
Patient ID: Alicia Cotto is a 59 y.o. female who presents for No chief complaint on file..    Referring Provider: Jenny Mcknight APRN-*  PCP: LUIS E Nelson-CNP Last visit with PCP: 1/22/25 Next visit with PCP: 3/27/25      Subjective     Current DM Regimen  Jardiance 25 mg daily     Past DM Regimen  Rybelsus: discontinued due to cost   Metformin: discontinued due to GI side effects  Glyburide: discontinued due to hypoglycemia and numbness of lips  Invokana: switched to Jardiance due to insurance preference   Pioglitazone starting Jardiance    Treatment Adherence:   Patient did take medications today.   Number of missed doses in last 7 days: 0.       Preferred pharmacy: [facility]  Can patient afford prescribed medications: Yes, Patient has no complaints     Diabetes  She presents for her follow-up diabetic visit. She has type 2 diabetes mellitus. Her A1c has increased from 7.5 to 10.1 There are no hypoglycemic associated symptoms. There are no diabetic associated symptoms. Current diabetic treatment includes oral agent (monotherapy).      DISCUSSION  Patient's A1c has increased to 10.1% since last visit  FBG: does not have any BG to report  Denies signs of hypoglycemia   Patient diet has been the same  Portion control  Low carb  Low sugar   Adding more protein   Patient continues to do exercises recommended in PT at home and stretches    Objective     There were no vitals taken for this visit.   BP Readings from Last 4 Encounters:   03/27/25 120/78   10/03/24 132/70   08/16/24 132/62   07/18/24 136/86      There were no vitals filed for this visit.     Labs  Lab Results   Component Value Date    BILITOT 0.9 03/24/2025    CALCIUM 9.7 03/24/2025    CO2 20 03/24/2025     03/24/2025    CREATININE 1.15 (H) 03/24/2025    GLUCOSE 214 (H) 03/24/2025    ALKPHOS 55 03/24/2025    K 4.7 03/24/2025    PROT 6.7 03/24/2025     03/24/2025    AST 19 03/24/2025    ALT 26 03/24/2025    BUN 31 (H)  03/24/2025    ANIONGAP 13 03/24/2025    ALBUMIN 4.3 03/24/2025    AMYLASE 21 (L) 01/15/2018    LIPASE 15 01/15/2018    GFRF 41 (A) 03/30/2023     Lab Results   Component Value Date    TRIG 179 (H) 03/24/2025    CHOL 136 03/24/2025    LDLCALC 72 03/24/2025    HDL 36 (L) 03/24/2025     Lab Results   Component Value Date    HGBA1C 10.1 (H) 03/24/2025       Current Outpatient Medications   Medication Instructions    albuterol 90 mcg/actuation inhaler 2 puffs, inhalation, Every 4 hours PRN, Every 4-6 hours as needed.    albuterol 2.5 mg, nebulization, Every 6 hours PRN    clobetasol (Olux) 0.05 % topical foam Topical, 2 times daily    cyclobenzaprine (FLEXERIL) 5 mg, oral, 3 times daily PRN    diclofenac sodium (Voltaren) 1 % gel gel Apply 4 grams to affected knee 3-4 x daily    empagliflozin (JARDIANCE) 25 mg, oral, Daily    ergocalciferol (VITAMIN D-2) 50,000 Units, oral, Once Weekly    FreeStyle lancets 28 gauge Use as instructed three times weekly    FreeStyle Lite Meter kit Use to check blood glucose 3 times weekly    FreeStyle Lite Strips strip Use to check blood glucose 3 times weekly    levothyroxine (SYNTHROID, LEVOXYL) 112 mcg, oral, Daily    lisinopril 40 mg, oral, Daily    montelukast (SINGULAIR) 10 mg, oral, Daily    simvastatin (ZOCOR) 40 mg, oral, Every evening         Assessment/Plan   Problem List Items Addressed This Visit             ICD-10-CM    Type 2 diabetes mellitus without complication, without long-term current use of insulin E11.9       DISCUSSION/ PLAN:  Patient's recent A1c has increased from 7.5% to 10.1%. She has been on monotherapy only (Jardiance 25 mg daily). No hypoglycemic event to report. But is reporting several yeast infections on multiple occasions with symptoms including irritation and itching on her vaginal area. Her most recently one was on 3/27. Patient was previously on Metformin but discontinued due to severe GI upset. She does not keep track her BG and only test 3-4 times a  week.      She might benefit from Mounjaro or other GLP1 to help with reduction of A1c and Jorge L to help track her BG. All GLP1s and Jorge L require PA, which we will work on for her. She stated that she will have to see what the copay is prior to starting any new medication. Discussed potentially starting on Januvia/ Tradjenta if PA is deny. She wished to stay on Jardiance for now til the PA resulted. Encourage patient to increased water intake and keep good personal hygienes to prevent future infections.  Potentially discontinue use if infection persist. She expressed understanding.     She is to start testing BG at least twice daily and record readings for clinical pharmacist at next visit. Will reach out to her with PA's result.     ADDENDUM: PA for Mounjaro and Jorge L were approved. Jorge L is 74.99$, which is too costly for patient. Mounjaro is 30$/monthly. Will have patient start on Mounjaro 2.5 mg weekly for help lower her A1c and BG. Patient also is to continue on Jardiance for now     Mounjaro Education:   Counseled patient on Mounjaro MOA, expectations, side effects, duration of therapy, administration, and monitoring parameters.  Provided detailed dosing and administration counseling to ensure proper technique.   Reviewed Mounjaro titration schedule, starting with 2.5 mg once weekly to a goal of 15 mg once weekly if tolerated  Counseled patient on the benefits of GLP-1ra glycemic control and weight loss  Reviewed storage requirements of Mounjaro when not in use, and when to administer the medication if a dose is missed.  Advised patient that they may experience improved satiety after meals and portion sizes of meals may be reduced as doses of Mounjaro increase.     START Mounjaro 2.5 mg weekly  CONTINUE all current regimen  Follow-up: 5/8     Time spent with pt: Total length of time 15 (minutes) of the encounter and more than 50% was spent counseling the patient.    Marta Alexandre, [facility] MED - PGY1  Resident     Continue all meds under the continuation of care with the referring provider and clinical pharmacy team.

## 2025-04-10 ENCOUNTER — APPOINTMENT (OUTPATIENT)
Dept: PHARMACY | Facility: HOSPITAL | Age: 60
End: 2025-04-10
Payer: COMMERCIAL

## 2025-04-10 DIAGNOSIS — E11.9 TYPE 2 DIABETES MELLITUS WITHOUT COMPLICATION, WITHOUT LONG-TERM CURRENT USE OF INSULIN: ICD-10-CM

## 2025-04-10 RX ORDER — TIRZEPATIDE 2.5 MG/.5ML
2.5 INJECTION, SOLUTION SUBCUTANEOUS
Qty: 2 ML | Refills: 1 | Status: SHIPPED | OUTPATIENT
Start: 2025-04-10

## 2025-04-10 NOTE — PROGRESS NOTES
I reviewed the progress note and agree with the resident’s findings and plans as written. Case discussed with resident.    Symone Honeycutt, AgathaD

## 2025-04-17 ENCOUNTER — APPOINTMENT (OUTPATIENT)
Dept: ORTHOPEDIC SURGERY | Facility: CLINIC | Age: 60
End: 2025-04-17
Payer: COMMERCIAL

## 2025-05-08 ENCOUNTER — APPOINTMENT (OUTPATIENT)
Dept: PHARMACY | Facility: HOSPITAL | Age: 60
End: 2025-05-08
Payer: COMMERCIAL

## 2025-05-08 DIAGNOSIS — E11.9 TYPE 2 DIABETES MELLITUS WITHOUT COMPLICATION, WITHOUT LONG-TERM CURRENT USE OF INSULIN: ICD-10-CM

## 2025-05-08 RX ORDER — TIRZEPATIDE 5 MG/.5ML
5 INJECTION, SOLUTION SUBCUTANEOUS WEEKLY
Qty: 2 ML | Refills: 1 | Status: SHIPPED | OUTPATIENT
Start: 2025-05-08

## 2025-05-09 NOTE — PROGRESS NOTES
Clinical Pharmacy Appointment    Patient ID: Alicia Cotto is a 60 y.o. female who presents for Diabetes.    Pt is here for Follow Up appointment.     Referring Provider: Jenny Mcknight APRN-*  PCP: LUIS E Nelson-CNP  Last visit with PCP: 3/27/25   Next visit with PCP: 10/9/25    Subjective   Treatment Adherence:   Patient did take medications today.   Number of missed doses in last 7 days: 0.       Preferred pharmacy: [facility]  Can patient afford prescribed medications: Yes, Patient has no complaints     Diabetes  She presents for her follow-up diabetic visit. She has type 2 diabetes mellitus. Her disease course has been stable. There are no hypoglycemic associated symptoms. There are no diabetic associated symptoms. Current diabetic treatment includes oral agent (monotherapy).     Current DM Regimen  Jardiance 25 mg daily  Mounjaro 2.5 mg once weekly     Past DM Regimen  Rybelsus: discontinued due to cost   Metformin: discontinued due to GI side effects  Glyburide: discontinued due to hypoglycemia and numbness of lips  Invokana: switched to Jardiance due to insurance preference   Pioglitazone starting Jardiance     DISCUSSION  Patient has started Mounjaro 2.5 mg   Experienced bloating and GI upsets during the first week however all symptoms have resolved now   Patient has been working on her diet   Eating more vegetables and choosing healthier snacks like Greek yogurt   Overall, patient feels that her energy level has increased   BG readings  FBG today: 95  2 hour post prandial B  Denies signs of hypoglycemia  Weight: 326 lb  Last weight in office: 332 lb on 3/27   Patient continues to tolerate Jardiance   Patient has no further questions or concerns     Objective   Allergies[1]  Social History     Social History Narrative    Not on file      Medication Review  Current Outpatient Medications   Medication Instructions    albuterol 90 mcg/actuation inhaler 2 puffs, inhalation, Every 4 hours  PRN, Every 4-6 hours as needed.    albuterol 2.5 mg, nebulization, Every 6 hours PRN    clobetasol (Olux) 0.05 % topical foam Topical, 2 times daily    cyclobenzaprine (FLEXERIL) 5 mg, oral, 3 times daily PRN    diclofenac sodium (Voltaren) 1 % gel gel Apply 4 grams to affected knee 3-4 x daily    empagliflozin (JARDIANCE) 25 mg, oral, Daily    ergocalciferol (VITAMIN D-2) 50,000 Units, oral, Once Weekly    FreeStyle lancets 28 gauge Use as instructed three times weekly    FreeStyle Lite Meter kit Use to check blood glucose 3 times weekly    FreeStyle Lite Strips strip Use to check blood glucose 3 times weekly    levothyroxine (SYNTHROID, LEVOXYL) 112 mcg, oral, Daily    lisinopril 40 mg, oral, Daily    montelukast (SINGULAIR) 10 mg, oral, Daily    Mounjaro 5 mg, subcutaneous, Weekly    simvastatin (ZOCOR) 40 mg, oral, Every evening      Vitals  BP Readings from Last 2 Encounters:   03/27/25 120/78   10/03/24 132/70     BMI Readings from Last 1 Encounters:   03/27/25 53.59 kg/m²      Labs  A1C  Lab Results   Component Value Date    HGBA1C 10.1 (H) 03/24/2025    HGBA1C 7.5 (H) 10/15/2024    HGBA1C 6.7 (A) 03/20/2024     BMP  Lab Results   Component Value Date    CALCIUM 9.7 03/24/2025     03/24/2025    K 4.7 03/24/2025    CO2 20 03/24/2025     03/24/2025    BUN 31 (H) 03/24/2025    CREATININE 1.15 (H) 03/24/2025    EGFR 55 (L) 03/24/2025     LFTs  Lab Results   Component Value Date    ALT 26 03/24/2025    AST 19 03/24/2025    ALKPHOS 55 03/24/2025    BILITOT 0.9 03/24/2025     FLP  Lab Results   Component Value Date    TRIG 179 (H) 03/24/2025    CHOL 136 03/24/2025    LDLF 94 03/30/2023    LDLCALC 72 03/24/2025    HDL 36 (L) 03/24/2025     Urine Microalbumin  Lab Results   Component Value Date    MICROALBCREA NOTE 03/24/2025     Weight Management  Wt Readings from Last 3 Encounters:   03/27/25 (!) 151 kg (332 lb)   12/19/24 (!) 166 kg (365 lb)   10/03/24 (!) 161 kg (356 lb)      There is no height or  weight on file to calculate BMI.     Assessment/Plan   Problem List Items Addressed This Visit       Type 2 diabetes mellitus without complication, without long-term current use of insulin    Diabetes is uncontrolled with A1c of 10.1% however likely due to steroid use while sick. Most recent fasting blood sugars have been in the 90s as patient has started Mounjaro  INCREASE to Mounjaro 5 mg once weekly  CONTINUE Jardiance 25 mg once daily          Relevant Medications    tirzepatide (Mounjaro) 5 mg/0.5 mL pen injector       Clinical Pharmacist follow-up: 6/5    Continue all meds under the continuation of care with the referring provider and clinical pharmacy team.           [1]   Allergies  Allergen Reactions    Ibuprofen Swelling     Can take asa ok    Metformin Other    Naproxen Swelling    Nsaids (Non-Steroidal Anti-Inflammatory Drug) Hives and Swelling

## 2025-05-09 NOTE — ASSESSMENT & PLAN NOTE
Diabetes is uncontrolled with A1c of 10.1% however likely due to steroid use while sick. Most recent fasting blood sugars have been in the 90s as patient has started Mounjaro  INCREASE to Mounjaro 5 mg once weekly  CONTINUE Jardiance 25 mg once daily

## 2025-05-14 ENCOUNTER — PATIENT OUTREACH (OUTPATIENT)
Dept: CARE COORDINATION | Age: 60
End: 2025-05-14
Payer: COMMERCIAL

## 2025-05-31 DIAGNOSIS — E11.9 TYPE 2 DIABETES MELLITUS WITHOUT COMPLICATION, WITHOUT LONG-TERM CURRENT USE OF INSULIN: ICD-10-CM

## 2025-06-02 RX ORDER — EMPAGLIFLOZIN 25 MG/1
25 TABLET, FILM COATED ORAL DAILY
Qty: 90 TABLET | Refills: 1 | OUTPATIENT
Start: 2025-06-02

## 2025-06-05 ENCOUNTER — APPOINTMENT (OUTPATIENT)
Dept: PHARMACY | Facility: HOSPITAL | Age: 60
End: 2025-06-05
Payer: COMMERCIAL

## 2025-06-05 DIAGNOSIS — E11.9 TYPE 2 DIABETES MELLITUS WITHOUT COMPLICATION, WITHOUT LONG-TERM CURRENT USE OF INSULIN: ICD-10-CM

## 2025-06-05 NOTE — ASSESSMENT & PLAN NOTE
Diabetes is uncontrolled with A1c of 10.1% however likely due to steroid use while sick. Most recent fasting blood sugars have been in the 90s as patient has started Mounjaro  CONTINUE Mounjaro 5 mg once weekly and Jardiance 25 mg once daily

## 2025-06-05 NOTE — PROGRESS NOTES
Clinical Pharmacy Appointment    Patient ID: Alicia Cotto is a 60 y.o. female who presents for Diabetes.    Pt is here for Follow Up appointment. At last visit, Mounjaro dose was increased to 5 mg once weekly.     Referring Provider: Jenny Mcknight APRN-*  PCP: LUIS E Nelson-CNP  Last visit with PCP: 3/27/25   Next visit with PCP: 10/9/25    Subjective   Treatment Adherence:   Patient did take medications today.   Number of missed doses in last 7 days: 0.       Preferred pharmacy: [facility]  Can patient afford prescribed medications: Yes, Patient has no complaints     Diabetes  She presents for her follow-up diabetic visit. She has type 2 diabetes mellitus. Her disease course has been stable. There are no hypoglycemic associated symptoms. There are no diabetic associated symptoms. Current diabetic treatment includes oral agent (monotherapy).      Current DM Regimen  Jardiance 25 mg daily  Mounjaro 5 mg once weekly      Past DM Regimen  Rybelsus: discontinued due to cost   Metformin: discontinued due to GI side effects  Glyburide: discontinued due to hypoglycemia and numbness of lips  Invokana: switched to Jardiance due to insurance preference   Pioglitazone starting Jardiance     DISCUSSION  Patient has been tolerating Mounjaro 5 mg well  Endorses some constipation but reports symptoms are manageable with increased water and fruit intake  FB  Denies signs of hypoglycemia  Current weight: 316 lb  Compared to 326 lb last month  Starting weight: 332 lb on 3/20/25  Patient feels better overall and is satisfied with progress  Patient has no further questions or concerns     Objective   Allergies[1]  Social History     Social History Narrative    Not on file      Medication Review  Current Outpatient Medications   Medication Instructions    albuterol 90 mcg/actuation inhaler 2 puffs, inhalation, Every 4 hours PRN, Every 4-6 hours as needed.    albuterol 2.5 mg, nebulization, Every 6 hours PRN     clobetasol (Olux) 0.05 % topical foam Topical, 2 times daily    cyclobenzaprine (FLEXERIL) 5 mg, oral, 3 times daily PRN    diclofenac sodium (Voltaren) 1 % gel gel Apply 4 grams to affected knee 3-4 x daily    empagliflozin (JARDIANCE) 25 mg, oral, Daily    ergocalciferol (VITAMIN D-2) 50,000 Units, oral, Once Weekly    FreeStyle lancets 28 gauge Use as instructed three times weekly    FreeStyle Lite Meter kit Use to check blood glucose 3 times weekly    FreeStyle Lite Strips strip Use to check blood glucose 3 times weekly    levothyroxine (SYNTHROID, LEVOXYL) 112 mcg, oral, Daily    lisinopril 40 mg, oral, Daily    montelukast (SINGULAIR) 10 mg, oral, Daily    Mounjaro 5 mg, subcutaneous, Weekly    simvastatin (ZOCOR) 40 mg, oral, Every evening      Vitals  BP Readings from Last 2 Encounters:   03/27/25 120/78   10/03/24 132/70     BMI Readings from Last 1 Encounters:   03/27/25 53.59 kg/m²      Labs  A1C  Lab Results   Component Value Date    HGBA1C 10.1 (H) 03/24/2025    HGBA1C 7.5 (H) 10/15/2024    HGBA1C 6.7 (A) 03/20/2024     BMP  Lab Results   Component Value Date    CALCIUM 9.7 03/24/2025     03/24/2025    K 4.7 03/24/2025    CO2 20 03/24/2025     03/24/2025    BUN 31 (H) 03/24/2025    CREATININE 1.15 (H) 03/24/2025    EGFR 55 (L) 03/24/2025     LFTs  Lab Results   Component Value Date    ALT 26 03/24/2025    AST 19 03/24/2025    ALKPHOS 55 03/24/2025    BILITOT 0.9 03/24/2025     FLP  Lab Results   Component Value Date    TRIG 179 (H) 03/24/2025    CHOL 136 03/24/2025    LDLF 94 03/30/2023    LDLCALC 72 03/24/2025    HDL 36 (L) 03/24/2025     Urine Microalbumin  Lab Results   Component Value Date    MICROALBCREA NOTE 03/24/2025     Weight Management  Wt Readings from Last 3 Encounters:   03/27/25 (!) 151 kg (332 lb)   12/19/24 (!) 166 kg (365 lb)   10/03/24 (!) 161 kg (356 lb)      There is no height or weight on file to calculate BMI.     Assessment/Plan   Problem List Items Addressed This  Visit       Type 2 diabetes mellitus without complication, without long-term current use of insulin    Diabetes is uncontrolled with A1c of 10.1% however likely due to steroid use while sick. Most recent fasting blood sugars have been in the 90s as patient has started Mounjaro  CONTINUE Mounjaro 5 mg once weekly and Jardiance 25 mg once daily              Relevant Medications    empagliflozin (Jardiance) 25 mg tablet    Other Relevant Orders    Hemoglobin A1c    Referral to Clinical Pharmacy       Clinical Pharmacist follow-up: 7/10 to review A1c results    Continue all meds under the continuation of care with the referring provider and clinical pharmacy team.             [1]   Allergies  Allergen Reactions    Ibuprofen Swelling     Can take asa ok    Metformin Other    Naproxen Swelling    Nsaids (Non-Steroidal Anti-Inflammatory Drug) Hives and Swelling

## 2025-07-06 LAB
EST. AVERAGE GLUCOSE BLD GHB EST-MCNC: 148 MG/DL
EST. AVERAGE GLUCOSE BLD GHB EST-SCNC: 8.2 MMOL/L
HBA1C MFR BLD: 6.8 %

## 2025-07-10 ENCOUNTER — APPOINTMENT (OUTPATIENT)
Dept: PHARMACY | Facility: HOSPITAL | Age: 60
End: 2025-07-10
Payer: COMMERCIAL

## 2025-07-10 DIAGNOSIS — E11.9 TYPE 2 DIABETES MELLITUS WITHOUT COMPLICATION, WITHOUT LONG-TERM CURRENT USE OF INSULIN: ICD-10-CM

## 2025-07-10 RX ORDER — TIRZEPATIDE 7.5 MG/.5ML
7.5 INJECTION, SOLUTION SUBCUTANEOUS WEEKLY
Qty: 2 ML | Refills: 1 | Status: SHIPPED | OUTPATIENT
Start: 2025-07-10

## 2025-07-10 NOTE — ASSESSMENT & PLAN NOTE
Diabetes has greatly improved and is controlled with A1c of 6.8% (previously at 10.1%) with goal of <7%. Patient has also lost ~ 30 lb since starting Mounjaro  INCREASE to Mounjaro 7.5 mg once weekly  Prescription sent to [facility]  CONTINUE Jardiance 25 mg once daily

## 2025-07-10 NOTE — PROGRESS NOTES
Clinical Pharmacy Appointment    Patient ID: Alicia Cotto is a 60 y.o. female who presents for Diabetes.    Pt is here for Follow Up appointment.     Referring Provider: Jenny Mcknight APRN-*  PCP: LUIS E Nelson-CNP  Last visit with PCP: 3/27/25   Next visit with PCP: 10/9/25    Subjective   Treatment Adherence:   Patient did take medications today.   Number of missed doses in last 7 days: 0.       Preferred pharmacy: [facility]  Can patient afford prescribed medications: Yes, Patient has no complaints     Diabetes  She presents for her follow-up diabetic visit. She has type 2 diabetes mellitus. Her disease course has been stable. There are no hypoglycemic associated symptoms. There are no diabetic associated symptoms. Current diabetic treatment includes oral agent (monotherapy).      Current DM Regimen  Jardiance 25 mg daily  Mounjaro 5 mg once weekly      Past DM Regimen  Rybelsus: discontinued due to cost   Metformin: discontinued due to GI side effects  Glyburide: discontinued due to hypoglycemia and numbness of lips  Invokana: switched to Jardiance due to insurance preference   Pioglitazone starting Jardiance    DISCUSSION   Patient continues to tolerate Mounjaro 5 mg well  Endorses slight constipation which is relieved with fiber gummy  Noticing stronger hunger this past month   Current weight: 303 lb  Compared to 316 lb last month   Starting weight: 332 lb on 3/20/25  Patient has been more active lately  Walking more  Completing PT exercises at home every other day  Boxing game at home  Overall patient is feeling much better and is pleased with weight loss progress  Congratulated patient on improved A1c of 6.8%  Patient is requesting to try increased dose of Mounjaro   Counseled on side effects  Patient has no further questions or concerns   Objective   Allergies[1]  Social History     Social History Narrative    Not on file      Medication Review  Current Outpatient Medications   Medication  Instructions    albuterol 90 mcg/actuation inhaler 2 puffs, inhalation, Every 4 hours PRN, Every 4-6 hours as needed.    albuterol 2.5 mg, nebulization, Every 6 hours PRN    clobetasol (Olux) 0.05 % topical foam Topical, 2 times daily    cyclobenzaprine (FLEXERIL) 5 mg, oral, 3 times daily PRN    diclofenac sodium (Voltaren) 1 % gel gel Apply 4 grams to affected knee 3-4 x daily    empagliflozin (JARDIANCE) 25 mg, oral, Daily    ergocalciferol (VITAMIN D-2) 50,000 Units, oral, Once Weekly    FreeStyle Lite Meter kit Use to check blood glucose 3 times weekly    FreeStyle Lite Strips strip Use to check blood glucose 3 times weekly    levothyroxine (SYNTHROID, LEVOXYL) 112 mcg, oral, Daily    lisinopril 40 mg, oral, Daily    montelukast (SINGULAIR) 10 mg, oral, Daily    simvastatin (ZOCOR) 40 mg, oral, Every evening      Vitals  BP Readings from Last 2 Encounters:   03/27/25 120/78   10/03/24 132/70     BMI Readings from Last 1 Encounters:   03/27/25 53.59 kg/m²      Labs  A1C  Lab Results   Component Value Date    HGBA1C 6.8 (H) 07/05/2025    HGBA1C 10.1 (H) 03/24/2025    HGBA1C 7.5 (H) 10/15/2024     BMP  Lab Results   Component Value Date    CALCIUM 9.7 03/24/2025     03/24/2025    K 4.7 03/24/2025    CO2 20 03/24/2025     03/24/2025    BUN 31 (H) 03/24/2025    CREATININE 1.15 (H) 03/24/2025    EGFR 55 (L) 03/24/2025     LFTs  Lab Results   Component Value Date    ALT 26 03/24/2025    AST 19 03/24/2025    ALKPHOS 55 03/24/2025    BILITOT 0.9 03/24/2025     FLP  Lab Results   Component Value Date    TRIG 179 (H) 03/24/2025    CHOL 136 03/24/2025    LDLF 94 03/30/2023    LDLCALC 72 03/24/2025    HDL 36 (L) 03/24/2025     Urine Microalbumin  Lab Results   Component Value Date    MICROALBCREA NOTE 03/24/2025     Weight Management  Wt Readings from Last 3 Encounters:   03/27/25 (!) 151 kg (332 lb)   12/19/24 (!) 166 kg (365 lb)   10/03/24 (!) 161 kg (356 lb)      There is no height or weight on file to  calculate BMI.     Assessment/Plan   Problem List Items Addressed This Visit       Type 2 diabetes mellitus without complication, without long-term current use of insulin    Diabetes has greatly improved and is controlled with A1c of 6.8% (previously at 10.1%) with goal of <7%. Patient has also lost ~ 30 lb since starting Mounjaro  INCREASE to Mounjaro 7.5 mg once weekly  Prescription sent to [facility]  CONTINUE Jardiance 25 mg once daily            Clinical Pharmacist follow-up: 8/7    Continue all meds under the continuation of care with the referring provider and clinical pharmacy team.           [1]   Allergies  Allergen Reactions    Ibuprofen Swelling     Can take asa ok    Metformin Other    Naproxen Swelling    Nsaids (Non-Steroidal Anti-Inflammatory Drug) Hives and Swelling

## 2025-08-07 ENCOUNTER — APPOINTMENT (OUTPATIENT)
Dept: PHARMACY | Facility: HOSPITAL | Age: 60
End: 2025-08-07
Payer: COMMERCIAL

## 2025-08-07 DIAGNOSIS — E11.9 TYPE 2 DIABETES MELLITUS WITHOUT COMPLICATION, UNSPECIFIED WHETHER LONG TERM INSULIN USE: ICD-10-CM

## 2025-08-07 DIAGNOSIS — E66.01 MORBID OBESITY WITH BMI OF 60.0-69.9, ADULT (MULTI): ICD-10-CM

## 2025-08-07 DIAGNOSIS — E11.9 TYPE 2 DIABETES MELLITUS WITHOUT COMPLICATION, WITHOUT LONG-TERM CURRENT USE OF INSULIN: Primary | ICD-10-CM

## 2025-08-07 RX ORDER — TIRZEPATIDE 10 MG/.5ML
10 INJECTION, SOLUTION SUBCUTANEOUS WEEKLY
Qty: 2 ML | Refills: 1 | Status: SHIPPED | OUTPATIENT
Start: 2025-08-07

## 2025-08-07 NOTE — PROGRESS NOTES
Clinical Pharmacy Appointment    Patient ID: Alicia Cotto is a 60 y.o. female who presents for Diabetes.    Pt is here for Follow Up appointment. At last visit, Mounjaro dose was increased to 7.5 mg weekly.     Referring Provider: Jenny Mcknight APRN-*  PCP: LUIS E Nelson-CNP  Last visit with PCP: 3/27/25   Next visit with PCP: 10/9/25    Subjective   Treatment Adherence:   Patient did take medications today.   Number of missed doses in last 7 days: 0.       Preferred pharmacy: [facility]  Can patient afford prescribed medications: Yes, Patient has no complaints     Diabetes  She presents for her follow-up diabetic visit. She has type 2 diabetes mellitus. Her disease course has been stable. There are no hypoglycemic associated symptoms. There are no diabetic associated symptoms. Current diabetic treatment includes oral agent (monotherapy).      Current DM Regimen  Jardiance 25 mg daily  Mounjaro 7.5 mg once weekly      Past DM Regimen  Rybelsus: discontinued due to cost   Metformin: discontinued due to GI side effects  Glyburide: discontinued due to hypoglycemia and numbness of lips  Invokana: switched to Jardiance due to insurance preference   Pioglitazone starting Jardiance     DISCUSSION   Patient has been tolerating Mounjaro 7.5 mg well  Endorsed some GI upset but symptoms are resolved  Did not feel any appetite suppression change   Current weight: 295 lb  Compared to 303 lb last month   Starting weight: 332 lb on 3/20/25  Patient continues to stay active  Denies signs of hypoglycemia  Patient would like to try next dose of Mounjaro  Counseled on side effects  Patient has no further questions or concerns     Objective   Allergies[1]  Social History     Social History Narrative    Not on file      Medication Review  Current Outpatient Medications   Medication Instructions    albuterol 90 mcg/actuation inhaler 2 puffs, inhalation, Every 4 hours PRN, Every 4-6 hours as needed.    albuterol 2.5 mg,  nebulization, Every 6 hours PRN    clobetasol (Olux) 0.05 % topical foam Topical, 2 times daily    cyclobenzaprine (FLEXERIL) 5 mg, oral, 3 times daily PRN    diclofenac sodium (Voltaren) 1 % gel gel Apply 4 grams to affected knee 3-4 x daily    empagliflozin (JARDIANCE) 25 mg, oral, Daily    ergocalciferol (VITAMIN D-2) 50,000 Units, oral, Once Weekly    FreeStyle Lite Meter kit Use to check blood glucose 3 times weekly    FreeStyle Lite Strips strip Use to check blood glucose 3 times weekly    levothyroxine (SYNTHROID, LEVOXYL) 112 mcg, oral, Daily    lisinopril 40 mg, oral, Daily    montelukast (SINGULAIR) 10 mg, oral, Daily    Mounjaro 7.5 mg, subcutaneous, Weekly    simvastatin (ZOCOR) 40 mg, oral, Every evening      Vitals  BP Readings from Last 2 Encounters:   03/27/25 120/78   10/03/24 132/70     BMI Readings from Last 1 Encounters:   03/27/25 53.59 kg/m²      Labs  A1C  Lab Results   Component Value Date    HGBA1C 6.8 (H) 07/05/2025    HGBA1C 10.1 (H) 03/24/2025    HGBA1C 7.5 (H) 10/15/2024     BMP  Lab Results   Component Value Date    CALCIUM 9.7 03/24/2025     03/24/2025    K 4.7 03/24/2025    CO2 20 03/24/2025     03/24/2025    BUN 31 (H) 03/24/2025    CREATININE 1.15 (H) 03/24/2025    EGFR 55 (L) 03/24/2025     LFTs  Lab Results   Component Value Date    ALT 26 03/24/2025    AST 19 03/24/2025    ALKPHOS 55 03/24/2025    BILITOT 0.9 03/24/2025     FLP  Lab Results   Component Value Date    TRIG 179 (H) 03/24/2025    CHOL 136 03/24/2025    LDLF 94 03/30/2023    LDLCALC 72 03/24/2025    HDL 36 (L) 03/24/2025     Urine Microalbumin  Lab Results   Component Value Date    MICROALBCREA NOTE 03/24/2025     Weight Management  Wt Readings from Last 3 Encounters:   03/27/25 (!) 151 kg (332 lb)   12/19/24 (!) 166 kg (365 lb)   10/03/24 (!) 161 kg (356 lb)      There is no height or weight on file to calculate BMI.     Assessment/Plan   Diabetes has greatly improved and is controlled with A1c of 6.8%  (previously at 10.1%) with goal of <7%. Patient has also lost ~ 37 lb since starting Mounjaro  INCREASE to Mounjaro 7.5 mg once weekly  Prescription sent to [facility]  CONTINUE Jardiance 25 mg once daily      Clinical Pharmacist follow-up: 9/5    Continue all meds under the continuation of care with the referring provider and clinical pharmacy team.         [1]   Allergies  Allergen Reactions    Ibuprofen Swelling     Can take asa ok    Metformin Other    Naproxen Swelling    Nsaids (Non-Steroidal Anti-Inflammatory Drug) Hives and Swelling

## 2025-09-04 ENCOUNTER — APPOINTMENT (OUTPATIENT)
Dept: PHARMACY | Facility: HOSPITAL | Age: 60
End: 2025-09-04
Payer: COMMERCIAL

## 2025-09-05 ENCOUNTER — APPOINTMENT (OUTPATIENT)
Dept: PHARMACY | Facility: HOSPITAL | Age: 60
End: 2025-09-05
Payer: COMMERCIAL

## 2025-10-03 ENCOUNTER — APPOINTMENT (OUTPATIENT)
Dept: PHARMACY | Facility: HOSPITAL | Age: 60
End: 2025-10-03
Payer: COMMERCIAL

## 2025-10-08 ENCOUNTER — APPOINTMENT (OUTPATIENT)
Dept: PRIMARY CARE | Facility: CLINIC | Age: 60
End: 2025-10-08
Payer: COMMERCIAL

## 2025-10-09 ENCOUNTER — APPOINTMENT (OUTPATIENT)
Dept: PRIMARY CARE | Facility: CLINIC | Age: 60
End: 2025-10-09
Payer: COMMERCIAL

## (undated) DEVICE — TUBING, MANIFOLD, LOW PRESSURE

## (undated) DEVICE — SHEATH, GLIDESHEATH, SLENDER, 6FR 10CM

## (undated) DEVICE — BAND, VASCULAR, RADIAL HEMOSTAT, EXTRA-LONG 29CM

## (undated) DEVICE — CATHETER, OPTITORQUE, 5FR, JACKY, 3.5/ 2H/110CM, CURVED